# Patient Record
Sex: FEMALE | Race: BLACK OR AFRICAN AMERICAN
[De-identification: names, ages, dates, MRNs, and addresses within clinical notes are randomized per-mention and may not be internally consistent; named-entity substitution may affect disease eponyms.]

---

## 2017-02-03 ENCOUNTER — HOSPITAL ENCOUNTER (EMERGENCY)
Dept: HOSPITAL 25 - ED | Age: 32
Discharge: HOME | End: 2017-02-03
Payer: COMMERCIAL

## 2017-02-03 VITALS — SYSTOLIC BLOOD PRESSURE: 121 MMHG | DIASTOLIC BLOOD PRESSURE: 57 MMHG

## 2017-02-03 DIAGNOSIS — M25.572: Primary | ICD-10-CM

## 2017-02-03 DIAGNOSIS — M25.562: ICD-10-CM

## 2017-02-03 PROCEDURE — 99281 EMR DPT VST MAYX REQ PHY/QHP: CPT

## 2017-02-03 NOTE — RAD
HISTORY: Left ankle pain, trauma



COMPARISONS: None



VIEWS: 3, Frontal, lateral, and oblique views of the left ankle



FINDINGS:



BONE DENSITY: Normal.

BONES: There is a well-corticated bone fragment off the distal fibula consistent with

remote avulsion injury. There is no acute displaced fracture or dislocation.    

JOINTS: There is no arthropathy.    

ALIGNMENT: There is no dislocation. 

SOFT TISSUES: Unremarkable.



OTHER FINDINGS: None.



IMPRESSION: 

NO ACUTE OSSEOUS INJURY. IF SYMPTOMS PERSIST, RECOMMEND REPEAT IMAGING.

## 2017-02-03 NOTE — ED
Lower Extremity





- HPI Summary


HPI Summary: 


31 F presents with left ankle pain for 2 days.  She says she slipped on black 

ice and twisted her knee/ankle. She denies any previous injury to the area.  

She has been able to ambulate on it.  She denies any numbness or tingling.  








- History of Current Complaint


Chief Complaint: EDExtremityLower


Stated Complaint: LEFT ANKLE PAIN//FALL


Time Seen by Provider: 02/03/17 01:07


Hx Last Menstrual Period: 2/2016


Pain Intensity: 9





- Allergies/Home Medications


Allergies/Adverse Reactions: 


 Allergies











Allergy/AdvReac Type Severity Reaction Status Date / Time


 


No Known Allergies Allergy   Verified 07/18/16 16:38














PMH/Surg Hx/FS Hx/Imm Hx


Endocrine/Hematology History: 


   Denies: Hx Anticoagulant Therapy


Cardiovascular History: 


   Denies: Hx Hypertension





- Surgical History


Surgery Procedure, Year, and Place: LEAP procedure


Infectious Disease History: Yes


Infectious Disease History: Reports: Hx of Known/Suspected MRSA - groin abcess


   Denies: History Other Infectious Disease, Traveled Outside the  in Last 30 

Days





- Family History


Known Family History: Positive: None - noncontirbutory


   Negative: Cardiac Disease





- Social History


Alcohol Use: None


Substance Use Type: Reports: None


Smoking Status (MU): Never Smoked Tobacco


Have You Smoked in the Last Year: No





Review of Systems


Negative: Fever


Negative: Chest Pain


Negative: Shortness Of Breath


Positive: Myalgia - left ankle pain


All Other Systems Reviewed And Are Negative: Yes





Physical Exam


Triage Information Reviewed: Yes


Vital Signs On Initial Exam: 


 Initial Vitals











Temp Pulse Resp BP Pulse Ox


 


 98.8 F   99   18   121/57   100 


 


 02/03/17 00:55  02/03/17 00:55  02/03/17 00:55  02/03/17 00:55  02/03/17 00:55











Vital Signs Reviewed: Yes


Appearance: Positive: Well-Appearing


Skin: Positive: Warm, Dry


Head/Face: Positive: Normal Head/Face Inspection


Eyes: Positive: Normal, Conjunctiva Clear


ENT: Positive: Normal ENT inspection, Pharynx normal, TMs normal


Respiratory/Lung Sounds: Positive: Clear to Auscultation, Breath Sounds Present


Cardiovascular: Positive: Normal, RRR


Musculoskeletal: Positive: Strength/ROM Intact - of left knee, toes,, Limited @ 

- ankle due to pain, Other - edema noted over anterior talofibular ligament and 

tenderness over ligament, no step of noted, good pulses, sensation grossly 

intact, no tenderness of knee, capillary refill<2 secs





Diagnostics





- Vital Signs


 Vital Signs











  Temp Pulse Resp BP Pulse Ox


 


 02/03/17 00:55  98.8 F  99  18  121/57  100














- Laboratory


Lab Statement: Any lab studies that have been ordered have been reviewed, and 

results considered in the medical decision making process.





- Radiology


  ** ankle


Xray Interpretation: No Acute Changes


Radiology Interpretation Completed By: ED Physician





  ** knee


Xray Interpretation: No Acute Changes


Radiology Interpretation Completed By: ED Physician





  ** tibia


Xray Interpretation: No Acute Changes


Radiology Interpretation Completed By: ED Physician





Lower Extremity Course/Dx





- Course


Course Of Treatment: 31 F presents with left ankle/knee pain for two days.  She 

was walking when she slipped on black ice and twisted her ankle/knee. She has 

been able to ambulate since.  no edema noted of knee one exam and is nontender.

  tenderness of ankle over anterior talofibular ligament. I read xray as normal

, likely sprain, will treat conservatively, patient agrees with plan





- Diagnoses


Differential Diagnosis/HQI/PQRI: Positive: Fracture (Closed), Sprain, Strain


Provider Diagnoses: 


 Left ankle pain








Discharge





- Discharge Plan


Condition: Good


Disposition: HOME


Patient Education Materials:  Ankle Sprain (ED)


Forms:  *Work Release


Referrals: 


[facility] [place]


Additional Instructions: 


Ice, rest, elevate


Keep ace wrap on area


Take Tylenol or ibuprofen for pain 


Set up care with primary care office to follow up


Return to ED if develop any numbness, tingling, or ability to move joint or any 

new symptoms

## 2017-02-03 NOTE — RAD
HISTORY: Left lower leg pain, fall



COMPARISONS: Left ankle dated February 03, 2017



VIEWS: 2, Frontal and lateral views of the proximal left foreleg. The left ankle is not

included within the field-of-view the current examination.



FINDINGS:



BONE DENSITY: Normal.

BONES: There is no displaced fracture.    

JOINTS: There is no arthropathy.    

ALIGNMENT: There is no dislocation. 

SOFT TISSUES: Unremarkable.



OTHER FINDINGS: None.



IMPRESSION: 

NO ACUTE OSSEOUS INJURY OF THE VISUALIZED PORTION OF THE LEFT FORELEG. IF SYMPTOMS

PERSIST, RECOMMEND REPEAT IMAGING.

## 2017-02-03 NOTE — RAD
INDICATION: Left knee pain     



COMPARISON: None



TECHNIQUE: AP, lateral, and oblique views were obtained.



FINDINGS: The bony structures, joint spaces, and soft tissues are normal for age.



IMPRESSION: NEGATIVE EXAMINATION.

## 2017-02-09 ENCOUNTER — HOSPITAL ENCOUNTER (EMERGENCY)
Dept: HOSPITAL 25 - UCEAST | Age: 32
Discharge: HOME | End: 2017-02-09
Payer: SELF-PAY

## 2017-02-09 VITALS — DIASTOLIC BLOOD PRESSURE: 93 MMHG | SYSTOLIC BLOOD PRESSURE: 139 MMHG
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
## 2017-02-09 DIAGNOSIS — S93.402A: Primary | ICD-10-CM
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
## 2017-02-09 PROCEDURE — G0463 HOSPITAL OUTPT CLINIC VISIT: HCPCS

## 2017-02-09 PROCEDURE — 99202 OFFICE O/P NEW SF 15 MIN: CPT

## 2017-02-09 NOTE — UC
Lower Extremity/Ankle HPI





- HPI Summary


HPI Summary: 





Slipped and fell on ice 6 days ago, was seen at ED and had negative ankle, knee

, and lower leg x-rays. Was given an ace wrap and crutches in the ED, but not 

using crutches because she has continued to work. Still having a lot of pain 

with weight bearing, feels she has been unable to rest because of work 

responsibilities. Scheduled to work double shift tomorrow.





- History of Current Complaint


Chief Complaint: UCLowerExtremity


Stated Complaint: FOLLOW UP LEG INJURY


Time Seen by Provider: 02/09/17 13:12


Hx Obtained From: Patient


Hx Last Menstrual Period: DEPO


Pregnant?: No


Onset/Duration: Sudden Onset


Severity Initially: Moderate


Severity Currently: Mild


Aggravating Factor(s): Standing, Ambulation


Alleviating Factor(s): Rest


Able to Bear Weight: Yes


Related History: Occupational Injury





- Allergies/Home Medications


Allergies/Adverse Reactions: 


 Allergies











Allergy/AdvReac Type Severity Reaction Status Date / Time


 


No Known Allergies Allergy   Verified 07/18/16 16:38














PMH/Surg Hx/FS Hx/Imm Hx


Cardiovascular History Of: 


   Denies: Hypertension


Other History Of: 


   Negative For: Anticoagulant Therapy





- Surgical History


Surgical History: Yes


Surgery Procedure, Year, and Place: LEAP procedure





- Family History


Known Family History: Positive: None - noncontirbutory


   Negative: Cardiac Disease





- Social History


Occupation: Employed Full-time - [employer]


Lives: With Family


Alcohol Use: None


Substance Use Type: None


Smoking Status (MU): Never Smoked Tobacco


Have You Smoked in the Last Year: No





- Immunization History


Most Recent Influenza Vaccination: 11/7/13


Most Recent Tetanus Shot: UTD


Most Recent Pneumonia Vaccination: never





Review of Systems


Constitutional: Negative


Skin: Negative


Eyes: Negative


ENT: Negative


Respiratory: Negative


Cardiovascular: Negative


Gastrointestinal: Negative


Genitourinary: Negative


Motor: Negative


Neurovascular: Negative


Musculoskeletal: Arthralgia - L ankle pain with swelling


Neurological: Negative


Psychological: Negative


All Other Systems Reviewed And Are Negative: Yes





Physical Exam


Triage Information Reviewed: Yes


Appearance: Well-Appearing, No Pain Distress, Well-Nourished


Vital Signs: 


 Initial Vital Signs











Temp  99.9 F   02/09/17 13:04


 


Pulse  101   02/09/17 13:04


 


Resp  14   02/09/17 13:04


 


BP  139/93   02/09/17 13:04


 


Pulse Ox  99   02/09/17 13:04











Vital Signs Reviewed: Yes


Eye Exam: Normal


Eyes: Positive: Conjunctiva Clear


ENT Exam: Normal


ENT: Positive: Normal ENT inspection, Hearing grossly normal, Pharynx normal, 

TMs normal


Dental Exam: Normal


Neck exam: Normal


Neck: Positive: Supple, Nontender, No Lymphadenopathy


Respiratory Exam: Normal


Respiratory: Positive: Chest non-tender, Lungs clear, Normal breath sounds, No 

respiratory distress, No accessory muscle use


Cardiovascular Exam: Normal


Cardiovascular: Positive: RRR, No Murmur


Musculoskeletal: Positive: ROM Limited @ - L ankle, Other: - tender over L 

lateral ankle ligaments only. No bony tenderness.


Neurological Exam: Normal


Psychological Exam: Normal


Skin Exam: Normal





Lower Extremity Course/Dx





- Differential Dx/Diagnosis


Provider Diagnoses: L ankle sprain





Discharge





- Discharge Plan


Condition: Stable


Disposition: HOME


Patient Education Materials:  Ankle Sprain (ED)


Forms:  *Work Release


Referrals: 


Una Cabrera MD [Medical Doctor] - 1 Week


Additional Instructions: 


If your ankle does not start to improve with better rest and immobilization, 

you may need further imaging (follow-up x-ray or MRI) to be ordered by the 

orthopedist. Keep the boot on for all weight-bearing until you follow up with 

the orthopedist.

## 2017-03-16 ENCOUNTER — HOSPITAL ENCOUNTER (OUTPATIENT)
Dept: HOSPITAL 25 - OR | Age: 32
Discharge: HOME | End: 2017-03-16
Attending: SURGERY
Payer: COMMERCIAL

## 2017-03-16 VITALS — DIASTOLIC BLOOD PRESSURE: 94 MMHG | SYSTOLIC BLOOD PRESSURE: 138 MMHG

## 2017-03-16 DIAGNOSIS — L73.2: Primary | ICD-10-CM

## 2017-03-16 LAB
MANUAL ENTRY VERIFICATION: (no result)
UR PREG INTERNAL CONTROL: (no result)
UR PREG KIT LOT#: (no result)

## 2017-03-16 PROCEDURE — 87070 CULTURE OTHR SPECIMN AEROBIC: CPT

## 2017-03-16 PROCEDURE — 36415 COLL VENOUS BLD VENIPUNCTURE: CPT

## 2017-03-16 PROCEDURE — 81025 URINE PREGNANCY TEST: CPT

## 2017-03-16 PROCEDURE — 86703 HIV-1/HIV-2 1 RESULT ANTBDY: CPT

## 2017-03-16 PROCEDURE — 88305 TISSUE EXAM BY PATHOLOGIST: CPT

## 2017-03-16 PROCEDURE — 87205 SMEAR GRAM STAIN: CPT

## 2017-03-16 PROCEDURE — 87073 CULTURE BACTERIA ANAEROBIC: CPT

## 2017-03-16 RX ADMIN — FENTANYL CITRATE PRN MCG: 0.05 INJECTION, SOLUTION INTRAMUSCULAR; INTRAVENOUS at 15:37

## 2017-03-16 RX ADMIN — FENTANYL CITRATE PRN MCG: 0.05 INJECTION, SOLUTION INTRAMUSCULAR; INTRAVENOUS at 16:04

## 2017-03-17 NOTE — OP
DATE OF OPERATION:  03/16/17 - hospitals

 

DATE OF BIRTH:  [birth year]

 

SURGEON:  Kristopher Christy MD

 

ASSISTANT:  PATRICK Connolly

 

ANESTHESIOLOGIST:  Dr. Lindquist.

 

ANESTHESIA:  General anesthetic, local infiltration.

 

PRE-OP DIAGNOSIS:  Hidradenitis of the left groin.

 

POST-OP DIAGNOSIS:  Hidradenitis of the left groin.

 

OPERATIVE PROCEDURE:  Wide excision, hidradenitis of left groin.

 

DESCRIPTION OF PROCEDURE:  The patient was supine on the operative table. After 
adequate general anesthetic, compression stockings, Echo Hugger warmer, and 
intravenous antibiotics, the left leg was put in the frog leg position and the 
entire groin, thigh, and lower abdomen region were prepped with Betadine and 
draped in a sterile fashion.  An elliptical incision was created to encompass 
all the severely infected areas and tracts and this was carried down to the 
fascia to remove in its entirety and down on to the thigh.  This was mobilized 
and brought upward and the adipose approximated with 3-0 Vicryl and skin with 3-
0 Prolene.  Two areas, one above and one below, were left partially open to 
allow packing and the corner of 4x4 was put into each site followed by bulky 
gauze dressing.  She was awakened and brought to Recovery in good condition.  
No complications.  No drains. Sponge and instrument counts correct.  Estimated 
blood loss was 50 mL.



CC:  Dr. Piero Jaramillo*

 

 [id number]/CPS #: [id number]

MTDD

## 2018-07-03 ENCOUNTER — HOSPITAL ENCOUNTER (EMERGENCY)
Dept: HOSPITAL 25 - UCEAST | Age: 33
Discharge: HOME | End: 2018-07-03
Payer: SELF-PAY

## 2018-07-03 VITALS — SYSTOLIC BLOOD PRESSURE: 137 MMHG | DIASTOLIC BLOOD PRESSURE: 85 MMHG

## 2018-07-03 DIAGNOSIS — S69.91XA: Primary | ICD-10-CM
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
## 2018-07-03 DIAGNOSIS — G43.909: ICD-10-CM

## 2018-07-03 DIAGNOSIS — R03.0: ICD-10-CM

## 2018-07-03 PROCEDURE — G0463 HOSPITAL OUTPT CLINIC VISIT: HCPCS

## 2018-07-03 PROCEDURE — 99212 OFFICE O/P EST SF 10 MIN: CPT
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
## 2018-07-03 NOTE — RAD
HISTORY: right thumb injury



COMPARISONS: None



VIEWS: 3, Frontal, lateral, and oblique views of the first digit of the right hand



FINDINGS:



BONE DENSITY: Normal.

BONES: There is no displaced fracture.

JOINTS: There is no arthropathy.

ALIGNMENT: There is no dislocation. 

SOFT TISSUES: Unremarkable.



OTHER FINDINGS: None.



IMPRESSION: 

NO ACUTE OSSEOUS INJURY. IF SYMPTOMS PERSIST, RECOMMEND REPEAT IMAGING.

## 2019-04-09 ENCOUNTER — HOSPITAL ENCOUNTER (EMERGENCY)
Dept: HOSPITAL 25 - UCEAST | Age: 34
Discharge: HOME | End: 2019-04-09
Payer: SELF-PAY

## 2019-04-09 VITALS — SYSTOLIC BLOOD PRESSURE: 117 MMHG | DIASTOLIC BLOOD PRESSURE: 65 MMHG

## 2019-04-09 DIAGNOSIS — G56.21: Primary | ICD-10-CM

## 2019-04-09 PROCEDURE — 99212 OFFICE O/P EST SF 10 MIN: CPT

## 2019-04-09 PROCEDURE — G0463 HOSPITAL OUTPT CLINIC VISIT: HCPCS

## 2019-04-09 NOTE — UC
Hand/Wrist HPI





- HPI Summary


HPI Summary: 





34 year old female presents with complaints of numbness and tingling to the 

right ulnar arm, hand, ring and pinky fingers x 2 weeks. Denies injury, headache

, neck pain, facial droop, slurred or difficulty speaking, extremity weakness, 

chest pain, or SOB.





- History Of Current Complaint


Chief Complaint: UCUpperExtremity


Stated Complaint: RT HAND NUMBNESS


Time Seen by Provider: 04/09/19 10:34


Hx Obtained From: Patient


Hx Last Menstrual Period: depo


Pain Intensity: 0





- Allergies/Home Medications


Allergies/Adverse Reactions: 


 Allergies











Allergy/AdvReac Type Severity Reaction Status Date / Time


 


No Known Allergies Allergy   Verified 04/09/19 08:59














PMH/Surg Hx/FS Hx/Imm Hx


Previously Healthy: Yes - Denies significant PMH


Other History Of: 


   Negative For: Anticoagulant Therapy





- Surgical History


Surgical History: Yes


Surgery Procedure, Year, and Place: leep procedure , 15 yrs ago - [place].

  bilateral axillary hidrandenitis - 2 yrs ago - [place]





- Family History


Known Family History: Positive: Non-Contributory





- Social History


Occupation: Employed Full-time


Lives: With Family


Alcohol Use: Rare


Substance Use Type: Marijuana


Substance Use Comment - Amount & Last Used: occ.


Smoking Status (MU): Never Smoked Tobacco


Have You Smoked in the Last Year: No





- Immunization History


Most Recent Influenza Vaccination: 11/7/13


Most Recent Tetanus Shot: UTD


Most Recent Pneumonia Vaccination: never





Review of Systems


All Other Systems Reviewed And Are Negative: Yes


Constitutional: Positive: Negative


Skin: Positive: Negative


Respiratory: Positive: Negative


Cardiovascular: Positive: Negative


Gastrointestinal: Positive: Negative


Genitourinary: Positive: Negative


Musculoskeletal: Negative: Arthralgia, Decreased ROM, Edema, Myalgia


Neurological: Positive: Paresthesia


Psychological: Positive: Negative


Is Patient Immunocompromised?: No





Physical Exam





- Summary


Physical Exam Summary: 


GENERAL APPEARANCE: Well developed, well nourished, alert and cooperative, and 

appears to be in no acute distress.





NECK: Neck supple, non-tender.





CARDIAC: Normal S1 and S2. No S3, S4 or murmurs. Rhythm is regular. There is no 

peripheral edema, cyanosis or pallor. Extremities are warm and well perfused. 

Capillary refill is less than 2 seconds. Peripheral pulses intact.





LUNGS: Clear to auscultation without rales, rhonchi, wheezing or diminished 

breath sounds.





ABDOMEN: Positive bowel sounds. Soft, nondistended, nontender. No guarding or 

rebound. No masses or hepatosplenomegally.





MUSKULOSKELETAL: ROM intact to all extremities. No joint erythema or 

tenderness. Normal muscular development. Normal gait.





NEUROLOGICAL: Bilatearl  strength symmetric and intact throughout. Light 

touch and ability to determine sharp and dull sensation intact throughout all 

dermatomes of the right hand and arm.





SKIN: Skin normal color, texture and turgor with no lesions or eruptions.





Triage Information Reviewed: Yes


Vital Signs: 


 Initial Vital Signs











Temp  97.7 F   04/09/19 08:56


 


Pulse  91   04/09/19 08:56


 


Resp  29   04/09/19 08:56


 


BP  117/65   04/09/19 08:56


 


Pulse Ox  100   04/09/19 08:56











Vital Signs Reviewed: Yes





Hand/Wrist Course/Dx





- Course


Course Of Treatment: 


34 year old female presents with complaints of numbness and tingling to the 

right ulnar arm, hand, ring and pinky fingers x 2 weeks. Denies injury, headache

, neck pain, facial droop, slurred or difficulty speaking, extremity weakness, 

chest pain, or SOB. Afebrile. VSS. Exam was overall unremarkable. I suspect 

that her symptoms are likely an ulnar neuropathy at the elbow however patient 

noted the parathesia was most prominent in the ulnar hand, ring, and pinky 

fingers therefore she was placed in a cockup wrist splint. Recommending 

conservative treatment including a referral to PT for evaluation and treatment. 

She is to follow up with her PCP within 7 days. Anticipatory guidance and 

warning symptoms were reviewed with the patient. Verbalizes understanding and 

agrees with POC.








- Differential Dx/Diagnosis


Differential Diagnosis/HQI/PQRI: Carpal Tunnel Syndrome, Sprain, Strain, 

Tendonitis


Provider Diagnosis: 


 Ulnar neuropathy








Discharge





- Sign-Out/Discharge


Documenting (check all that apply): Patient Departure


All imaging exams completed and their final reports reviewed: No Studies





- Discharge Plan


Condition: Stable


Disposition: HOME


Patient Education Materials:  Cubital Tunnel Syndrome (ED)


Referrals: 


Piero Jaramillo MD [Primary Care Provider] - 7 Days


Additional Instructions: 


I suspect that your symptoms are from a condition called ulnar nerve 

neuropathy. It is most likely that this is occurring at the level of the elbow. 

We will try some conservative treatments to see if the symptoms improve.





Use the cock-up wrist splint that was provided to you.





Avoid leaning on the elbows when seated or driving and avoid prolonged elbow 

flexion.





The use of a soft foam elbow pad may also help to prevent compression of the 

ulnar nerve at the elbow.





I have given you an order for physical therapy which may be beneficial.





Follow up with your primary care provider in 7 days if no improvement in 

symptoms as you may need some nerve conduction testing to further evaluate the 

symptoms.





Seek immediate medical attention in the emergency room if you develop chest pain

, shortness of breath, sudden severe headache, facial droop, slurred or 

difficulty speaking, weakness of the extremities, or any worsening of symptoms.





- Billing Disposition and Condition


Condition: STABLE


Disposition: Home

## 2019-04-11 ENCOUNTER — HOSPITAL ENCOUNTER (EMERGENCY)
Dept: HOSPITAL 25 - UCEAST | Age: 34
Discharge: HOME | End: 2019-04-11
Payer: COMMERCIAL

## 2019-04-11 VITALS — DIASTOLIC BLOOD PRESSURE: 65 MMHG | SYSTOLIC BLOOD PRESSURE: 107 MMHG

## 2019-04-11 DIAGNOSIS — L02.31: Primary | ICD-10-CM

## 2019-04-11 DIAGNOSIS — L73.2: ICD-10-CM

## 2019-04-11 NOTE — UC
HPI Wound/Suture Re-check





- HPI Summary


HPI Summary: 





patient here for suture removal of buttocks where abscess was drained. Placed 

April 3, 2019.  No complaints of pain or temperature.





- History Of Current Complaint


Chief Complaint: UCLaceration


Stated Complaint: SUTURE REMOVAL


Time Seen by Provider: 04/11/19 09:06


Hx Last Menstrual Period: depo


Pain Intensity: 0





- Allergies/Home Medications


Allergies/Adverse Reactions: 


 Allergies











Allergy/AdvReac Type Severity Reaction Status Date / Time


 


No Known Allergies Allergy   Verified 04/11/19 08:36














PMH/Surg Hx/FS Hx/Imm Hx





- Additional Past Medical History


Additional PMH: 





PMH: hx of hidrandenitis supparativa.  On chronic doxycyline.





Family Hx: DM.


Previously Healthy: Yes


Other History Of: 


   Negative For: Anticoagulant Therapy





- Surgical History


Surgical History: Yes


Surgery Procedure, Year, and Place: leep procedure , 15 yrs ago - [place].

  bilateral axillary hidrandenitis - 2 yrs ago - [place]





- Family History


Known Family History: Positive: Diabetes, Non-Contributory





- Social History


Occupation: Employed Full-time - works with clients with disabilities


Alcohol Use: Rare


Substance Use Type: Marijuana


Substance Use Comment - Amount & Last Used: occ.


Smoking Status (MU): Never Smoked Tobacco


Have You Smoked in the Last Year: No





- Immunization History


Most Recent Influenza Vaccination: 11/7/13


Most Recent Tetanus Shot: UTD


Most Recent Pneumonia Vaccination: never





Review of Systems


All Other Systems Reviewed And Are Negative: Yes


Skin: Positive: Other - healed abscess buttocks


Respiratory: Positive: Negative.  Negative: Shortness Of Breath


Cardiovascular: Positive: Negative


Gastrointestinal: Positive: Negative


Genitourinary: Positive: Negative





Physical Exam





- Summary


Physical Exam Summary: 





Appearance: The patient is well-appearing, is in no pain or distress, and is 

well-nourished.


Eyes: Conjunctiva are clear.  Pupils are equal and reactive to light and 

accommodation.  Extra ocular muscle movement is intact.


ENT: The hearing is grossly normal, the pharynx is normal, and the TMs are 

normal. There is no muffled or hoarse voice.  No stridor.


Neck: The neck is supple and there is no lymphadenopathy.


Respiratory: The chest is nontender to palpation and without crepitus. The 

lungs are clear, there are normal breath sounds, and there is no respiratory 

distress.  No wheezes, rales or rhonchi.


Cardiovascular: Heart sounds reveal a regular rate and rhythm. There are no 

clicks, rubs or murmurs.  There are no carotid bruits or thrills.  Circulation 

is grossly intact.


Abdomen: The abdomen is soft and nontender. There is no organomegaly.  Bowel 

sounds are present and within normal limits.  No point tenderness at McBurneys 

point.


Musculoskeletal: Strength is intact. The patient moves all extremities.  


Neurological: The patient is alert. Motor and sensory are  examination grossly 

intact.  Speech is normal.


Psychological: The patient displays age appropriate behavior


Skin: area of buttocks; well healed abscess. No other skin abscess.





PROCEDURE: 3 SUTURES REMOVED. Slight induration.  No infection. Slight pain 

with palpation.





Triage Information Reviewed: Yes


Vital Signs: 


 Initial Vital Signs











Temp  98.8 F   04/11/19 08:33


 


Pulse  100   04/11/19 08:33


 


Resp  20   04/11/19 08:33


 


BP  107/65   04/11/19 08:33


 


Pulse Ox  100   04/11/19 08:33














Course/Dx





- Course


Course Of Treatment: 





33 yo for suture removal for abscess of buttocks.  No sign of infection.  

Slight induration and tenderness.  3 stitches removed.  Patient knows to follow 

up for increasing pain, swelling or redness.  Dx. is resolved abscess, buttocks.








- Differential Dx - Laceration/Wound


Differential Diagnoses: Suture Removal





- Diagnosis


Provider Diagnosis: 


 Visit for suture removal, Hidradenitis suppurativa








Discharge





- Sign-Out/Discharge


Documenting (check all that apply): Patient Departure


All imaging exams completed and their final reports reviewed: No Studies





- Discharge Plan


Condition: Stable


Disposition: HOME


Patient Education Materials:  Stitches Removal (ED)


Referrals: 


Piero Jaramillo MD [Primary Care Provider] - 


Additional Instructions: 


AS WE DISCUSSED: 





Your 3 stitches have been removed from the area of your buttocks.  There is 

still mild tenderness, but there is no sign of infection.  Watch for any 

increasing swelling, redness, or pain.  Recheck if you note any of these.


 


PLEASE SEEK CARE AT THE EMERGENCY DEPARTMENT IF SYMPTOMS WORSEN OR IF NEW 

SYMPTOMS DEVELOP.  





FOLLOW UP WITH YOUR PRIMARY CARE PHYSICIAN IF CONDITION CONTINUES BEYOND 3 DAYS 

WITHOUT IMPROVEMENT.


D





- Billing Disposition and Condition


Condition: STABLE


Disposition: Home

## 2019-06-08 ENCOUNTER — HOSPITAL ENCOUNTER (EMERGENCY)
Dept: HOSPITAL 25 - UCEAST | Age: 34
Discharge: HOME | End: 2019-06-08
Payer: COMMERCIAL

## 2019-06-08 VITALS — DIASTOLIC BLOOD PRESSURE: 68 MMHG | SYSTOLIC BLOOD PRESSURE: 121 MMHG

## 2019-06-08 DIAGNOSIS — M25.562: Primary | ICD-10-CM

## 2019-06-08 PROCEDURE — 96372 THER/PROPH/DIAG INJ SC/IM: CPT

## 2019-06-08 PROCEDURE — G0463 HOSPITAL OUTPT CLINIC VISIT: HCPCS

## 2019-06-08 PROCEDURE — 99212 OFFICE O/P EST SF 10 MIN: CPT

## 2019-06-08 NOTE — UC
Knee Pain HPI





- HPI Summary


HPI Summary: 





35 yo female c/o progressive left knee pain over the past week.  Works on her 

feet a lot, mostly nights.  Has been difficult to bear weight. Knee feels more 

warm and more swollen progressively.  Not red.  No fever / chills.  No recent 

injury.  No other joint c/o's.  No GI issues.  No rash.  No cough / sob.  Some 

swelling in both lower exts/ feet. 





- History of Current Complaint


Chief Complaint: UCLowerExtremity


Stated Complaint: KNEE COMPLAINT


Time Seen by Provider: 06/08/19 10:50


Hx Obtained From: Patient


Hx Last Menstrual Period: depo


Pain Intensity: 7





- Allergies/Home Medications


Allergies/Adverse Reactions: 


 Allergies











Allergy/AdvReac Type Severity Reaction Status Date / Time


 


No Known Allergies Allergy   Verified 06/08/19 10:50














PMH/Surg Hx/FS Hx/Imm Hx


Previously Healthy: Yes


Other History Of: 


   Negative For: Anticoagulant Therapy





- Surgical History


Surgical History: Yes


Surgery Procedure, Year, and Place: leep procedure , 15 yrs ago - [place].

  bilateral axillary hidrandenitis - 2 yrs ago - [place]





- Family History


Known Family History: Positive: Diabetes, Non-Contributory





- Social History


Alcohol Use: Rare


Substance Use Type: Marijuana


Substance Use Comment - Amount & Last Used: occ.


Smoking Status (MU): Never Smoked Tobacco


Have You Smoked in the Last Year: No





- Immunization History


Most Recent Influenza Vaccination: 11/7/13


Most Recent Tetanus Shot: UTD


Most Recent Pneumonia Vaccination: never





Review of Systems


All Other Systems Reviewed And Are Negative: Yes


Constitutional: Positive: Negative


Skin: Positive: Other - see hpi


Eyes: Positive: Negative


ENT: Positive: Negative


Respiratory: Positive: Negative


Cardiovascular: Positive: Negative


Gastrointestinal: Positive: Negative


Genitourinary: Positive: Negative


Motor: Positive: Other - see hpi


Neurovascular: Positive: Negative


Musculoskeletal: Positive: Other: - see hpi


Neurological: Positive: Negative


Psychological: Positive: Negative


Is Patient Immunocompromised?: No





Physical Exam


Triage Information Reviewed: Yes


Appearance: Well-Appearing, Well-Nourished


Vital Signs: 


 Initial Vital Signs











Temp  98 F   06/08/19 10:48


 


Pulse  78   06/08/19 10:48


 


Resp  17   06/08/19 10:48


 


BP  121/68   06/08/19 10:48


 


Pulse Ox  100   06/08/19 10:48











Vital Signs Reviewed: Yes


Eye Exam: Normal - grossly normal


ENT Exam: Normal - grossly normal


Neck exam: Normal


Respiratory Exam: Normal - RR normal, no dyspnea, no tachypnea


Cardiovascular Exam: Normal - HR normal, nondiaphoretic.


Abdominal Exam: Normal


Abdomen Description: Positive: Nontender - benign


Musculoskeletal Exam: Other - Left knee + swelling, tender inf patella and med 

patella.  + post knee fluctunce.  no redness.  No streaking.  Able to 

straighten albeit uncomfortable.  Not able to fully bend.   No open sores.  No 

kae joint laxity, but there is hesitation with examination


Neurological Exam: Normal - nonfocal


Psychological Exam: Normal - conversing easily and appropriately


Skin Exam: Normal - no visible or reported rash





Knee Pain Course/Dx





- Course


Course Of Treatment: 





Ketorolac 60mg im x 1


Xray L knee - see [facility].  





Has crutches at home. Will look into possible knee scooter (maybe at work). 





F/u orthopedics, referral given today.  





Questions as posed answered to the best of my ability. 





- Differential Dx/Diagnosis


Provider Diagnosis: 


 Knee pain, acute








Discharge





- Sign-Out/Discharge


Documenting (check all that apply): Post-Discharge Follow Up


All imaging exams completed and their final reports reviewed: Yes





- Discharge Plan


Condition: Stable


Disposition: HOME


Prescriptions: 


Meloxicam 7.5 mg PO DAILY #30 tablet


Patient Education Materials:  Knee Pain (ED)


Forms:  *Work Release


Referrals: 


Piero Jaramillo MD [Primary Care Provider] - 


Additional Instructions: 


Please stay off your foot as much as possible. 


Elevate frequently. 


Ace wrap (or neoprene wrap) during the day if possible. 





Please follow up with Orthopedics early this week. 





Seek medical attention for worse or new problems, especially if redness, fever, 

worse or new pain. 








- Billing Disposition and Condition


Condition: STABLE


Disposition: Home

## 2019-06-22 ENCOUNTER — HOSPITAL ENCOUNTER (EMERGENCY)
Dept: HOSPITAL 25 - ED | Age: 34
Discharge: HOME | End: 2019-06-22
Payer: SELF-PAY

## 2019-06-22 VITALS — SYSTOLIC BLOOD PRESSURE: 123 MMHG | DIASTOLIC BLOOD PRESSURE: 80 MMHG

## 2019-06-22 DIAGNOSIS — R07.81: ICD-10-CM

## 2019-06-22 DIAGNOSIS — R10.31: Primary | ICD-10-CM

## 2019-06-22 LAB
ALBUMIN SERPL BCG-MCNC: 3.8 G/DL (ref 3.2–5.2)
ALBUMIN/GLOB SERPL: 0.8 {RATIO} (ref 1–3)
ALP SERPL-CCNC: 44 U/L (ref 34–104)
ALT SERPL W P-5'-P-CCNC: 8 U/L (ref 7–52)
ANION GAP SERPL CALC-SCNC: 8 MMOL/L (ref 2–11)
AST SERPL-CCNC: 11 U/L (ref 13–39)
BASOPHILS # BLD AUTO: 0 10^3/UL (ref 0–0.2)
BUN SERPL-MCNC: 8 MG/DL (ref 6–24)
BUN/CREAT SERPL: 11.1 (ref 8–20)
CALCIUM SERPL-MCNC: 9.2 MG/DL (ref 8.6–10.3)
CHLORIDE SERPL-SCNC: 109 MMOL/L (ref 101–111)
EOSINOPHIL # BLD AUTO: 0 10^3/UL (ref 0–0.6)
GLOBULIN SER CALC-MCNC: 4.6 G/DL (ref 2–4)
GLUCOSE SERPL-MCNC: 120 MG/DL (ref 70–100)
HCG SERPL QL: 33.62 MIU/ML
HCO3 SERPL-SCNC: 22 MMOL/L (ref 22–32)
HCT VFR BLD AUTO: 36 % (ref 35–47)
HGB BLD-MCNC: 12 G/DL (ref 12–16)
LYMPHOCYTES # BLD AUTO: 1.8 10^3/UL (ref 1–4.8)
MCH RBC QN AUTO: 30 PG (ref 27–31)
MCHC RBC AUTO-ENTMCNC: 33 G/DL (ref 31–36)
MCV RBC AUTO: 89 FL (ref 80–97)
MONOCYTES # BLD AUTO: 0.4 10^3/UL (ref 0–0.8)
NEUTROPHILS # BLD AUTO: 4.2 10^3/UL (ref 1.5–7.7)
NRBC # BLD AUTO: 0 10^3/UL
NRBC BLD QL AUTO: 0
PLATELET # BLD AUTO: 300 10^3/UL (ref 150–450)
POTASSIUM SERPL-SCNC: 4 MMOL/L (ref 3.5–5)
PROT SERPL-MCNC: 8.4 G/DL (ref 6.4–8.9)
RBC # BLD AUTO: 4.07 10^6 /UL (ref 3.7–4.87)
RBC UR QL AUTO: (no result)
SODIUM SERPL-SCNC: 139 MMOL/L (ref 135–145)
WBC # BLD AUTO: 6.5 10^3/UL (ref 3.5–10.8)
WBC UR QL AUTO: (no result)

## 2019-06-22 PROCEDURE — 87086 URINE CULTURE/COLONY COUNT: CPT

## 2019-06-22 PROCEDURE — 84702 CHORIONIC GONADOTROPIN TEST: CPT

## 2019-06-22 PROCEDURE — 81015 MICROSCOPIC EXAM OF URINE: CPT

## 2019-06-22 PROCEDURE — 85025 COMPLETE CBC W/AUTO DIFF WBC: CPT

## 2019-06-22 PROCEDURE — 96374 THER/PROPH/DIAG INJ IV PUSH: CPT

## 2019-06-22 PROCEDURE — 96375 TX/PRO/DX INJ NEW DRUG ADDON: CPT

## 2019-06-22 PROCEDURE — 83690 ASSAY OF LIPASE: CPT

## 2019-06-22 PROCEDURE — 81003 URINALYSIS AUTO W/O SCOPE: CPT

## 2019-06-22 PROCEDURE — 87088 URINE BACTERIA CULTURE: CPT

## 2019-06-22 PROCEDURE — 74176 CT ABD & PELVIS W/O CONTRAST: CPT

## 2019-06-22 PROCEDURE — 36415 COLL VENOUS BLD VENIPUNCTURE: CPT

## 2019-06-22 PROCEDURE — 99283 EMERGENCY DEPT VISIT LOW MDM: CPT

## 2019-06-22 PROCEDURE — 96361 HYDRATE IV INFUSION ADD-ON: CPT

## 2019-06-22 PROCEDURE — 86140 C-REACTIVE PROTEIN: CPT

## 2019-06-22 PROCEDURE — 80053 COMPREHEN METABOLIC PANEL: CPT

## 2019-06-22 NOTE — ED
Complex/Multi-Sys Presentation





- HPI Summary


HPI Summary: 


34 year old F presenting to [facility] with a chief complaint of sharp right-sided 

flank pain since 23:00 yesterday. The patient rates the pain 7/10 in severity. 

Symptoms aggravated by deep breathing. Symptoms alleviated by nothing. Patient 

reports myalgia and chills. Patient denies cough, fever. Patient states she is 

having difficulty breathing due to the pain.





- History Of Current Complaint


Chief Complaint: EDShortnessOfBreath


Time Seen by Provider: 06/22/19 09:14


Hx Obtained From: Patient


Onset/Duration: Sudden Onset, Lasting Hours - 21:00 yesterday, Still Present


Timing: Constant


Severity Currently: Moderate


Aggravating Factor(s): Deep breathing


Alleviating Factor(s): Nothing


Associated Signs And Symptoms: Positive: Other - Myalgia, chills, difficulty 

breathing; NEG: Fever, cough





- Allergies/Home Medications


Allergies/Adverse Reactions: 


 Allergies











Allergy/AdvReac Type Severity Reaction Status Date / Time


 


No Known Allergies Allergy   Verified 06/22/19 09:17














PMH/Surg Hx/FS Hx/Imm Hx


Previously Healthy: No


Endocrine/Hematology History: 


   Denies: Hx Anticoagulant Therapy


Cardiovascular History: 


   Denies: Hx Hypertension


GI History: Reports: Hx Gastroesophageal Reflux Disease - only during pregnancy


Sensory History: Reports: Hx Contacts or Glasses - contacts, will wear glasses 

day of surgery


Opthamlomology History: Reports: Hx Contacts or Glasses - contacts, will wear 

glasses day of surgery


Neurological History: Reports: Hx Migraine - takes prn excedrin





- Surgical History


Surgery Procedure, Year, and Place: leep procedure , 15 yrs ago - [place].

  bilateral axillary hidrandenitis - 2 yrs ago - [place]


Hx Anesthesia Reactions: No


Infectious Disease History: No


Infectious Disease History: Reports: Hx of Known/Suspected MRSA - groin abcess


   Denies: History Other Infectious Disease, Traveled Outside the [place] in Last 30 

Days





- Family History


Known Family History: Positive: Diabetes


   Negative: Cardiac Disease, Hypertension





- Social History


Alcohol Use: Rare


Hx Substance Use: Yes


Substance Use Type: Reports: Marijuana


Substance Use Comment - Amount & Last Used: occ.


Hx Tobacco Use: No


Smoking Status (MU): Never Smoked Tobacco


Have You Smoked in the Last Year: No





Review of Systems


Positive: Chills.  Negative: Fever


Positive: Other - difficulty breathing .  Negative: Cough


Positive: flank pain - right


Positive: Myalgia


All Other Systems Reviewed And Are Negative: Yes





Physical Exam





- Summary


Physical Exam Summary: 


VITAL SIGNS: Reviewed.


GENERAL: Patient is a well-developed and nourished FEMALE who is in acute 

distress secondary to her pain.


HEAD AND FACE: No signs of trauma. No ecchymosis, hematomas or skull 

depressions. No sinus tenderness.


EYES: PERRLA, EOMI x 2, No injected conjunctiva, no nystagmus.


EARS: Hearing grossly intact. Ear canals and tympanic membranes are within 

normal limits.


MOUTH: Oropharynx within normal limits.


NECK: Supple, trachea is midline, no adenopathy, no JVD, no carotid bruit, no c-

spine tenderness, neck with full ROM.


CHEST: Symmetric, no tenderness at palpation


LUNGS: Clear to auscultation bilaterally. No wheezing or crackles.


CVS: Regular rate and rhythm, S1 and S2 present, no murmurs or gallops 

appreciated.


ABDOMEN: Soft, non-tender. No signs of distention. No rebound no guarding, and 

no masses palpated. Bowel sounds are normal.


BACK: Patient has right flank tenderness and right CVA tenderness 


EXTREMITIES: FROM in all major joints, no edema, no cyanosis or clubbing.


NEURO: Alert and oriented x 3. No acute neurological deficits. Speech is normal 

and follows commands.


SKIN: Dry and warm. 


Triage Information Reviewed: Yes


Vital Signs On Initial Exam: 


 Initial Vitals











Temp Pulse Resp BP Pulse Ox


 


 97.4 F   79   22   134/88   98 


 


 06/22/19 09:14  06/22/19 09:14  06/22/19 09:14  06/22/19 09:14  06/22/19 09:14











Vital Signs Reviewed: Yes





Diagnostics





- Vital Signs


 Vital Signs











  Temp Pulse Resp BP Pulse Ox


 


 06/22/19 09:14  97.4 F  79  22  134/88  98














- Laboratory


Result Diagrams: 


 06/22/19 09:38





 06/22/19 09:38


Lab Statement: Any lab studies that have been ordered have been reviewed, and 

results considered in the medical decision making process.





- Radiology


  ** Ribs with CXR


Radiology Interpretation Completed By: Radiologist


Summary of Radiographic Findings: 1. Negative exam. ED physician has reviewed 

this report.





- CT


  ** Abdomen/Pelvis


CT Interpretation Completed By: Radiologist


Summary of CT Findings: 1. Normal appendix documented. 2. Negative for 

obstructive uropathy. 3. No etiology for RIGHT flank pain evident. 4. Noted 

inflammatory change at the LEFT groin crease and labia majora corresponds with.

  site of recent abscess drainage per conversation with the referring 

physician. ED physician has reviewed this report.





Re-Evaluation





- Re-Evaluation


  ** First Eval


Re-Evaluation Time: 11:37


Change: Improved


Comment: Patient updated on plan of care. She is agreeable to discharge





Complex Multi-Symp Course/Dx


Assessment/Plan: 34 year old F presenting to [facility] with a chief complaint of 

sharp right-sided flank pain since 23:00 yesterday. The patient rates the pain 7

/10 in severity. Symptoms aggravated by deep breathing. Symptoms alleviated by 

nothing. Patient reports myalgia and chills. Patient denies cough, fever. 

Patient states she is having difficulty breathing due to the pain.  Blood work 

without a significant abnormality except for glucose of 128, CRP is 12.9, 

myoglobin is 4.6.  Abdominal and pelvic CT IMPRESSION:  1. Normal appendix 

documented.  2. Negative for obstructive uropathy.  3. No etiology for RIGHT 

flank pain evident.  4. Noted inflammatory change at the LEFT groin crease and 

labia majora corresponds with site of recent abscess drainage per conversation 

with the referring physician.  As reported by the Pelvic CT, the patient had an 

I&D of the left Labia majora.  CXR: negative exam.  In the ED course, the 

patient was given Toradol for the pain and the symptoms have significantly 

improved.  Since the patients blood test results, chest x-ray and abdominal 

pelvic CT are negative, I believe that the patients pain is probably secondary 

to muscular skeletal pain.  The patient is not tachycardic or hypoxic so I 

think that the patient may have a type of pulmonary embolism.  I discussed all 

the findings and test results with the patient. Patient was instructed to 

return to the emergency room immediately if any of the symptoms return worsens. 

Plan of care was discussed with the patient and understands and agrees. All 

questions were answered at patient satisfaction.  There were no further 

complaints or concerns. Lung exam before discharge: CTA B/L. Good air exchange. 

No wheezing or crackles heard. CVS: S1 and S2 present. No murmurs appreciated. 

Patient is alert and oriented x 3. Patient is hemodynamically stable. Patient 

will be discharged home with follow up PCP in the next 2-3 days.





- Diagnoses


Provider Diagnoses: 


 Flank pain, Rib pain on right side








Discharge





- Sign-Out/Discharge


Documenting (check all that apply): Patient Departure - Discharge


Patient Received Moderate/Deep Sedation with Procedure: No





- Discharge Plan


Condition: Stable


Disposition: HOME


Patient Education Materials:  Flank Pain (ED)


Referrals: 


Piero Jaramillo MD [Primary Care Provider] - 3 Days


Additional Instructions: 


Follow up with your primary care provider in 3 days. Return to the Emergency 

Department for new or worsening symptoms.





- Billing Disposition and Condition


Condition: STABLE


Disposition: Home





- Attestation Statements


Document Initiated by Scribe: Yes


Documenting Scribe: Cecile Thomas


Provider For Whom Scribe is Documenting (Include Credential): Basim Dias MD


Scribe Attestation: 


Cecile STONE, scribed for Basim Dias MD on 06/22/19 at 1306. 


Scribe Documentation Reviewed: Yes


Provider Attestation: 


The documentation as recorded by the [email or web address] joe accurately reflects 

the service I personally performed and the decisions made by Basim pickard MD


Status of Scribe Document: Viewed

## 2019-06-22 NOTE — XMS REPORT
Continuity of Care Document (C-CDA R2.1) (Encounter date: 2019 10:00 AM)

 Created on:2019



Patient:TANVI

Sex:Female

:1985

External Reference #:[id number]





Demographics







 Address  [address]



   [address]



   [place] [address]

 

 Work Phone  [phone]

 

 Mobile Phone  [phone]

 

 Home Phone  [phone]

 

 Email Address  [email or web address]

 

 Preferred Language  und

 

 Marital Status  Not  or 

 

 Taoist Affiliation  Unknown

 

 Race  Unknown

 

 Additional Race(s)  Other Race

 

 Ethnic Group  Not  or 









Author







 Organization  [facility]

 

 Address  [address] [place], [place] [address]

 

 Phone  [phone]









Support







 Name  Relationship  Address  Phone

 

 EDDA MOSELEY  Unavailable  Unavailable  [phone]









Care Team Providers







 Name  Role  Phone

 

 Lu NP, Michelle  Unavailable  Unavailable

 

 PPSFL, NURSE OR MA  Unavailable  Unavailable









Allergies, Adverse Reactions, Alerts







 Substance  Reaction  Status

 

 No Known Allergies    Active







Medications







 Medication  Instructions  Dosage  Effective Dates  Status  Comments



       (start - stop)    

 

 Depo-Provera 150 mg/mL  IM Q 11-13 weeks     -  Active  



 intramuscular suspension          







Problems







 Condition  Effective Dates (start -  Clinical Status  Comments



   stop)    

 

 Encounter for surveillance of      



 injectable contraceptive      

 

 Encounter for surveillance of      



 injectable contraceptive      

 

 Human immunodeficiency virus [HIV]   -    



 counseling      

 

 Encounter for screening for human   -    



 immunodeficiency virus      

 

 Encounter for oth screening for      



 malignant neoplasm of breast      

 

 Encntr screen for infections w      



 sexl mode of transmiss      

 

 Encounter for surveillance of      



 injectable contraceptive      

 

 Encntr for gyn exam (general)      



 (routine) w abnormal findings      

 

 Hidradenitis suppurativa      

 

 Candidiasis of vulva and vagina      

 

 Human immunodeficiency virus [HIV]   -    



 counseling      

 

 Encounter for surveillance of      



 injectable contraceptive      

 

 Encounter for surveillance of      



 injectable contraceptive      

 

 Encounter for surveillance of      



 injectable contraceptive      

 

 Encounter for surveillance of      



 injectable contraceptive      

 

 Encounter for surveillance of      



 injectable contraceptive      

 

 Human immunodeficiency virus [HIV]   -    



 counseling      

 

 Encounter for oth general cnsl and   -    



 advice on contraception      

 

 Body mass index (BMI) 31.0-31.9,      



 adult      

 

 Encounter for surveillance of      



 injectable contraceptive      

 

 Encounter for surveillance of      



 injectable contraceptive      

 

 Encounter for surveillance of      



 injectable contraceptive      

 

 Encounter for surveillance of      



 injectable contraceptive      

 

 Encounter for surveillance of      



 injectable contraceptive      

 

 Encounter for surveillance of      



 injectable contraceptive      

 

 Encntr screen for infections w      



 sexl mode of transmiss      

 

 Encounter for surveillance of      



 injectable contraceptive      

 

 Encounter for surveillance of      



 injectable contraceptive      

 

 Encounter for surveillance of      



 injectable contraceptive      

 

 Encounter for surveillance of      



 injectable contraceptive      

 

 LABORATORY EXAM NOS      

 

 LABORATORY EXAM NOS      







Procedures







 Procedure  Date

 

 INJECTION OR LAB ONLY VISIT EST  

 

 INJECTION DEPO/CEFTRIAXONE  

 

 OTHER Medical Services  

 

 Contraceptive  

 

 Cns.Svc. Other  

 

 Cns.Svc. STI  

 

 DEPO  







Results







 Test Name  Date and Time  Measure  Units  Reference Range  Abnormal Flag  
Status  Comments









 No information







Advance Directives







 Directive  Yes / No  Effective Date  File Name









 No information







Encounters







 Encounter  Practice  Location  Reason(s)  Diagnoses  Date  Provider  Providers



 Description      For Visit        Copied on



               Encounter

 

   Planned  PPSFL    Encounter for    Lu  Referring



   [facility]    surveillance of    Michelle. [address]  Provider:



   [facility]      injectable  [address],  Michelle



   Finger      contraceptive    [place], [place],  Raphaelidis



   [facility], [address]          [address].  , [address]



   [address]          tel:[phone]  [address],



   St, [place],          [address]  [place], [place],



   [place],            [address].



   [id number],            tel:[phone]



   [place]            [id number]



   tel:[phone]            lting



   [id number]            Provider:



               NURSE OR MA



               PPSFL.

 

   Planned  PPSFL    Encounter for    Javier  Referring



   [facility]    surveillance of    Caitlin. [address]  Provider:



   [facility]      injectable  [address],  Caitlin



   Finger      contraceptive    [place], [place],  Javier J, [address]



   [place], [address]          [address], [place].  [address]



   [address]          tel:[phone]  [address], [place],



   St, [place],          [address]  [place], [address].



   [place],            tel:[phone]



   [phone],            [address]



   [place]            



   tel:[phone]            



   [phone]            

 

   Planned  PPSFL    Human    White Sarah.  Referring



   [facility]  [place]    immunodeficienc  -  [address]  Provider:



   [facility]      y virus [HIV]  [address], [place],  Sarah



   [place]inger      counselingEncou    [place], [address],  [place], [address]



   [place], [address]      nter for    [place].  [address]



   [address]      screening for      [address], [place],



   St, [place],      human      [place], [address].



   [place],      immunodeficienc      



   [id number],      y      



   [place]      virusEncounter      



   tel:[phone]      for oth      



   [id number]      screening for      



         malignant      



         neoplasm of      



         breastEncntr      



         screen for      



         infections w      



         sexl mode of      



         transmissEncoun      



         ter for      



         surveillance of      



         injectable      



         contraceptiveEn      



         cntr for gyn      



         exam (general)      



         (routine) w      



         abnormal      



         findingsHidrade      



         nitis      



         suppurativaCand      



         idiasis of      



         vulva and      



         vagina      

 

   Planned  PPSFL    Human  Nov-0  Karly Smith.  Referring



   [facility]  [place]    immunodeficienc  6-  [address]  Provider:



   [facility]      y virus [HIV]  [address], [place],  Sarah



   [place]      [place]ou    [place], [address],  [place], [address]



   [place], [address]      nter for    [place].  [address]



   [address]      surveillance of      [address], [place],



   St, [place],      injectable      [place], [address].



   [place],      contraceptive      



   [id number],            



   [place]            



   tel:[phone]            



   [phone]            

 

   Planned  PPSFL    Encounter for  Aug-2  Lu  Referring



   [facility]    surveillance of  8  [address]le. [address]  Provider:



   [facility]      injectable  [address],  Michelle



   Finger      contraceptive    [place],  [place]hael[place]



   [facility], [address]          [address].  , [address]



   [address]          tel:[phone]  [address],



   St, [place],          [address]  [place],



   [place],            [address].



   [id number],            tel:[phone]



   [place]            [id number]



   tel:[phone]            lting



   453433            Provider:



               NURSE OR MA



               PPSFL.

 

   Planned  PPSFL    Encounter for    Karly Smith.  Referring



   [facility]    surveillance of  9  [address]  Provider:



   [facility]      injectable  [address], [place],  Sarah



   [place]aceptive    [place], [address],  [place], [address]



   [place]          [place].  [address]



   [address]            [address], [place],



   St, [place],            [place], [address].



   [place],            



   [id number],            



   [place]            



   tel:[phone]            



   [phone]            

 

   Planned  PPSFL    Encounter for    Karly Smith.  Referring



   [facility]    surveillance of  0-  [address]  Provider:



   [facility]      injectable  [address]  [address], [place],  Sarah



   [place]ace[place], [address],  [place], [address]



   [place], [address]          [place].  [address]



   [address]            [address], [place],



   St, [place]



   [place],            [address].Consu



   [id number],            lting



   [place]            Provider:



   tel:[phone]            NURSE OR MA



   [id number]            PPSFL.

 

   Planned  PPSFL    Encounter for    Karly Smith.  Referring



   [facility]    surveillance of    [address]  Provider:



   [facility]      injectable  [address]  [address], [place],  Sarah



   [place]aceptive    [place], [address],  [place], [address]



   [place], [address]          [place].  [address]



   [address]            [address], [place],



   St, [place],



   [place],            [address].Consu



   [id number],            lting



   [place]            Provider:



   tel:[phone]            NURSE OR MA



   [id number]            PPSFL.

 

   Planned  PPSFL    Human  Nov-  Karly Smith.  Referring



   [facility]    immunodeficienc  0-201  [address]  Provider:



   [facility]      y virus [HIV]  [address], [place],  Sarah



   [place]      counselingEncou    [place], [address],  [place], [address]



   [place], [address]      nter for oth    [place].  [address]



   [address]      general cnsl      , [place],



   St, [place],      and advice on      [place], [address].



   [place],      contraceptionBo      



   [id number],      dy mass index      



   [place]      (BMI)      



   tel:[phone]      31.0-31.9,      



   778[address]      adultEncounter      



         for      



         surveillance of      



         injectable      



         contraceptive      

 

   Planned  PPSFL    Encounter for  Sep-0  Goodreau-Hem  Referring



   [facility]    surveillance of  [address]  [address].  Provider:



   [facility]      injectable  [address]  [address]  [place]



   Finger      contraceptive    St, [place],  Karma R,



   [place], [address]          [place], [address].  [address]



   [address]          tel:[phone]  [address],



   St, [place],          [address]  [place],



   [place],            [address].



   [id number],            tel:[phone]



   [place]            [id number]



   tel:[phone]            lting



   870329            Provider:



               NURSE OR MA



               PPSFL.

 

   Planned  PPSFL    Encounter for  Jul-0  Karly Smith.  Referring



   [facility]    surveillance of  3-201  [address]  Provider:



   [facility]      injectable  [address], [place],  [place]



   [place]aceptive    [place], [address],  Karma R,



   [facility], [address]          [place].  [address]



   [address]            [address],



   St, [place],            [place],



   [place],            [address].



   [id number],            tel:[phone]



   [place]            [id number]



   tel:[phone]            lting



   969925            Provider:



               NURSE OR MA



               PPSFL.

 

   Planned  PPSFL    Encounter for    White Sarah.  Referring



   [facility]    surveillance of    [address]  Provider:



   [facility]      injectable  [address]  [address], [place],  [place]



   [place]aceptive    [place], [address],  [place]ao Chen, 620          [place].  [address]



   [address]            [address],



   St, [place],            [place],



   [place],            [address].



   [id number],            tel:[phone]



   [place]            [id number]



   tel:[phone]            lting



   464497            Provider:



               NURSE OR MA



               PPSFL.

 

   Planned  PPSFL    Encounter for    Borglum  Referring



   [facility]    surveillance of  -  Carrie. [address]  Provider:



   [facility]      injectable  [address],  [place]



   Finger      contraceptive    [place],  Pao Chen, [address]          [address], [place].  [address]



   [address]          tel:[phone]  [address],



   , [place],          [address]  [place],



   [place],            [address].



   [id number],            tel:[phone]



   [place]            [id number]



   tel:[phone]            lting



   667344            Provider:



               NURSE OR MA



               PPSFL.

 

   Planned  PPSFL    Encounter for    Guggino  Referring



   [facility]    surveillance of    Olivia.  Provider:



   [facility]      injectable  [address]  [address]  [place]



   Finger      contraceptiveEn    St, [place],  Pao Chen, [address]      cntr screen for    [place], [address],  [address]



   [address]      infections w    [place].  [address],



   St, [place],      sexl mode of    tel:[phone]  [place],



   [place],      transmiss    [address]  [address].



   [id number],            tel:[phone]



   [place]            [address]



   tel:[phone]            



   903414            

 

   Planned  PPSFL    Encounter for  Aug-3  White Sarah.  Referring



   [facility]    surveillance of    [address]  Provider:



   [facility]      injectable  [address]  [address], [place],  [place]



   [place]aceptive    [place], [address],  Pao Chen, 620          [place].  [address]



   [address]            [address],



   St, [place],            [place],



   [place],            [address].



   [id number],            tel:[phone]



   [place]            [id number]



   tel:[phone]            lting



   507936            Provider:



               NURSE OR MA



               PPSFL.

 

   Planned  PPSFL    Encounter for    White Sarah.  Referring



   [facility]    surveillance of    [address]  Provider:



   [facility]      injectable  [address]  [address], [place],  [place]



   [place]aceptive    [place], [address],  Karma CORDOBA,



   [facility], [address]          [place].  [address]



   [address]            [address],



   St, [place],            [place],



   [place],            [address].



   [id number],            tel:[phone]



   [place]            [id number]



   tel:[phone]            lting



   281390            Provider:



               NURSE OR MA



               PPSFL.

 

   Planned  PPSFL        [place].  



   [facility]  [place]        [address]  



   [facility]        [address], [place],  



   [place]          [place], [address].  



   [facility], [address]          tel:[phone]  



   [address]          [address]  



   [address], [place],            



   [place],            



   [id number],            



   [place]            



   tel:[phone]            



   935612            

 

   Planned  PPSFL    Encounter for    [place].  Referring



   [facility]    surveillance of    [address]  Provider:



   [facility]      injectable  6  , [place],  [place]



   [place]aceptive    [place], [address].  [place], [address]



   [facility], [facility]          tel:[phone]  [address]



   [address]  [address], [place],



   St, [place],            [place], [address].



   [place],            tel:[phone]



   [id number],            [id number]



   [place]            lting



   tel:[phone]            Provider:



   [id number]            NURSE OR MA



               PPSFL.

 

   Planned  PPSFL    Encounter for  Oct-2  Parete  Referring



   [facility]    surveillance of    Kathy. [address]  Provider:



   [facility]      injectable  [address],  [place]



   Finger      contraceptive    [place],  [place], 620



   [facility], [facility]          [address].  [address]



   [address]          tel:[phone]  [address], [place],



   St, [place],          [address]  [place], [address].



   [place],            tel:[phone]



   [phone],            [address]



   [place]            



   tel:[phone]            



   643430            

 

   Planned  PPSFL    LABORATORY EXAM  Oct-3  DiCostanzo  Consulting



   [facility]    NOS  -  [place]. [address]  Provider:



   [facility]        [address],  NURSE OR MA



   Finger          [place],  PPSFL.



   [facility], [facility]          [address].  



   [address]          tel:[phone]  



   St, [place],          [address]  



   [place],            



   [id number],            



   [place]            



   tel:[phone]            



   [phone]            

 

   Planned  PPSFL    LABORATORY EXAM  Sep-  Goodreau-Hem  



   [facility]  [place]    NOS  [address]  radha [address].  



   [facility]        [address]  



   Finger          [facility],  



   [place], [address]          [place], [address].  



   [address]          tel:[phone]  



   [facility],          [address]  



   [place],            



   [id number],            



               



   tel:[phone]            







Family History







 Family Member  Diagnosis  Age At Onset

 

 Sister  No history of Myocardial infarction before age 65  

 

 Brother  No history of Myocardial infarction before age 55  

 

 Father  No history of Myocardial infarction  

 

   No family history of Blood clots, legs  

 

 Brother  No history of Stroke before age 55  

 

 1st degree relative  No hx of osteoporosis  

 

 Sister  No history of Stroke before age 65  

 

 Father  No history of Stroke  

 

 Mother  No history of Stroke  

 

 Mother  No history of Myocardial infarction  

 

 1st degree relative  No hx of venous thromboembolism  

 

 1st degree relative  No hx of coronary heart disease (female <65, male  



   <55)  

 

 1st degree relative  No hx of cancer of breast, colon, endometrium or  



   ovary  

 

   No family history of Blood clots, lung  







Immunizations







 Vaccine  Date  Status  Comments

 

 HPV, unspecified formulation  Sep-  administered  Note: PER [facility] HX ; 
Source:



       Source Unspecified

 

 HPV, unspecified formulation  Sep-  administered  Note: PER HEALTH HX ; 
Source:



       Source Unspecified

 

 HPV (9-valent)  Sep-  administered  Note: PER HEALTH HX ; Source:



       Source Unspecified







Payers







 Payer name  Insurance type  Covered party ID  Authorization(s)

 

 [facility]  [id number]  







Social History







 Type  Description  Quantity  Date Captured  Comments

 

 Alcohol Use Details  Unknown      

 

 Caffeine Use Details  Unknown      

 

 Tobacco Use Status  Unknown      

 

 Smoking Status  Never smoker      

 

 Birth Sex  Female      







Vital Signs







 Date /  Height  Weight  BMI  Pulse  Blood  Temperature  Respiratory  Body  
Head  BMI  Pulse  Inhaled



 Time:        Rate  Pressure    Rate  Surface  Circumference  percentile  Ox  Ox



                 Area        









 No information







Chief Complaint And Reason For Visit

No information



Reason For Referral







 Reason For Referral

 

 No information







Plan Of Treatment







 Date  Type  Action  Status









 No information







History Of Present Illness







 Encounter Date  Complaint  History Of Present Illness









 No information







Functional Status







 Date  Functional Assessment









 No information







Medications Administered







 Medication  Instructions  Dosage  Effective Dates (start - stop)  Status  
Comments









 No information







Instructions







 Date  Instruction  Additional Information









 No information







Assessments







 Type  Assessment  Date

 

 assessment  Encounter for surveillance of injectable contraceptive  







Goals







 Health Concern  Goal  Type  Priority  Status  Date









 No information







Medical Equipment







 Description  Device  Universal Device Identifier  Effective Dates (start - stop
)  Status









 No information







Mental Status







 Date  Cognitive Assessment









 No information







Health Concerns







 Observation  Date









 No information









 Concern  Status  Date









 No information

## 2019-06-23 ENCOUNTER — HOSPITAL ENCOUNTER (EMERGENCY)
Dept: HOSPITAL 25 - ED | Age: 34
Discharge: HOME | End: 2019-06-23
Payer: SELF-PAY

## 2019-06-23 VITALS — DIASTOLIC BLOOD PRESSURE: 75 MMHG | SYSTOLIC BLOOD PRESSURE: 132 MMHG

## 2019-06-23 DIAGNOSIS — R05: ICD-10-CM

## 2019-06-23 DIAGNOSIS — N76.0: ICD-10-CM

## 2019-06-23 DIAGNOSIS — L03.111: ICD-10-CM

## 2019-06-23 DIAGNOSIS — G43.909: ICD-10-CM

## 2019-06-23 DIAGNOSIS — R06.02: ICD-10-CM

## 2019-06-23 DIAGNOSIS — Z86.14: ICD-10-CM

## 2019-06-23 DIAGNOSIS — R07.89: Primary | ICD-10-CM

## 2019-06-23 LAB
ALBUMIN SERPL BCG-MCNC: 3.8 G/DL (ref 3.2–5.2)
ALBUMIN/GLOB SERPL: 0.8 {RATIO} (ref 1–3)
ALP SERPL-CCNC: 42 U/L (ref 34–104)
ALT SERPL W P-5'-P-CCNC: 8 U/L (ref 7–52)
ANION GAP SERPL CALC-SCNC: 6 MMOL/L (ref 2–11)
AST SERPL-CCNC: 10 U/L (ref 13–39)
BASOPHILS # BLD AUTO: 0 10^3/UL (ref 0–0.2)
BUN SERPL-MCNC: 7 MG/DL (ref 6–24)
BUN/CREAT SERPL: 9.6 (ref 8–20)
CALCIUM SERPL-MCNC: 9.1 MG/DL (ref 8.6–10.3)
CHLORIDE SERPL-SCNC: 108 MMOL/L (ref 101–111)
EOSINOPHIL # BLD AUTO: 0.1 10^3/UL (ref 0–0.6)
GLOBULIN SER CALC-MCNC: 4.6 G/DL (ref 2–4)
GLUCOSE SERPL-MCNC: 113 MG/DL (ref 70–100)
HCG SERPL QL: < 0.6 MIU/ML
HCO3 SERPL-SCNC: 25 MMOL/L (ref 22–32)
HCT VFR BLD AUTO: 35 % (ref 35–47)
HGB BLD-MCNC: 11.8 G/DL (ref 12–16)
INR PPP/BLD: 1.16 (ref 0.82–1.09)
LYMPHOCYTES # BLD AUTO: 2.1 10^3/UL (ref 1–4.8)
MCH RBC QN AUTO: 30 PG (ref 27–31)
MCHC RBC AUTO-ENTMCNC: 33 G/DL (ref 31–36)
MCV RBC AUTO: 89 FL (ref 80–97)
MONOCYTES # BLD AUTO: 0.5 10^3/UL (ref 0–0.8)
NEUTROPHILS # BLD AUTO: 4 10^3/UL (ref 1.5–7.7)
NRBC # BLD AUTO: 0 10^3/UL
NRBC BLD QL AUTO: 0.1
PLATELET # BLD AUTO: 310 10^3/UL (ref 150–450)
POTASSIUM SERPL-SCNC: 3.7 MMOL/L (ref 3.5–5)
PROT SERPL-MCNC: 8.4 G/DL (ref 6.4–8.9)
RBC # BLD AUTO: 3.98 10^6 /UL (ref 3.7–4.87)
SODIUM SERPL-SCNC: 139 MMOL/L (ref 135–145)
TROPONIN I SERPL-MCNC: 0 NG/ML (ref ?–0.04)
WBC # BLD AUTO: 6.8 10^3/UL (ref 3.5–10.8)

## 2019-06-23 PROCEDURE — 80053 COMPREHEN METABOLIC PANEL: CPT

## 2019-06-23 PROCEDURE — 84702 CHORIONIC GONADOTROPIN TEST: CPT

## 2019-06-23 PROCEDURE — 99284 EMERGENCY DEPT VISIT MOD MDM: CPT

## 2019-06-23 PROCEDURE — 36415 COLL VENOUS BLD VENIPUNCTURE: CPT

## 2019-06-23 PROCEDURE — 85379 FIBRIN DEGRADATION QUANT: CPT

## 2019-06-23 PROCEDURE — 85025 COMPLETE CBC W/AUTO DIFF WBC: CPT

## 2019-06-23 PROCEDURE — 71275 CT ANGIOGRAPHY CHEST: CPT

## 2019-06-23 PROCEDURE — 93005 ELECTROCARDIOGRAM TRACING: CPT

## 2019-06-23 PROCEDURE — 84484 ASSAY OF TROPONIN QUANT: CPT

## 2019-06-23 PROCEDURE — 85610 PROTHROMBIN TIME: CPT

## 2019-06-23 NOTE — ED
HPI Chest Pain





- HPI Summary


HPI Summary: 


Pt. is a 34 y.o female who presents to the ER for CP and SOB x 1 day. Pt. was 

seen in ED yesterday for abd/flank pain. Pt. had labs and ct scan that were 

unremarkable. Pt. states her pain moved into her chest today and she feels 

short of breath. Past hx of hidradenitis. Pt. receives depo. Pt. notes recent 

cough but denies fever, abd. pain, V/D, or urinary sxs. NO hx of asthma. Sxs 

are moderate in severity. NO current modifying factors. 








- History of Current Complaint


Chief Complaint: EDChestPainROMI


Time Seen by Provider: 06/23/19 06:09


Hx Obtained From: Patient


Hx Last Menstrual Period: depo


Pain Intensity: 10





- Allergy/Home Medications


Allergies/Adverse Reactions: 


 Allergies











Allergy/AdvReac Type Severity Reaction Status Date / Time


 


No Known Allergies Allergy   Verified 06/22/19 09:17














PMH/Surg Hx/FS Hx/Imm Hx


Previously Healthy: Yes


Endocrine/Hematology History: 


   Denies: Hx Anticoagulant Therapy


Cardiovascular History: 


   Denies: Hx Hypertension


GI History: Reports: Hx Gastroesophageal Reflux Disease - only during pregnancy


Sensory History: Reports: Hx Contacts or Glasses - contacts, will wear glasses 

day of surgery


Opthamlomology History: Reports: Hx Contacts or Glasses - contacts, will wear 

glasses day of surgery


Neurological History: Reports: Hx Migraine - takes prn excedrin





- Surgical History


Surgery Procedure, Year, and Place: leep procedure , 15 yrs ago - [facility].

  bilateral axillary hidrandenitis - 2 yrs ago - [facility]


Hx Anesthesia Reactions: No





- Immunization History


Immunizations Up to Date: Yes


Infectious Disease History: No


Infectious Disease History: Reports: Hx of Known/Suspected MRSA - groin abcess


   Denies: History Other Infectious Disease, Traveled Outside the [place] in Last 30 

Days





- Family History


Known Family History: Positive: Diabetes


   Negative: Cardiac Disease, Hypertension





- Social History


Occupation: Employed Full-time


Lives: With Family


Alcohol Use: Rare


Hx Substance Use: Yes


Substance Use Type: Reports: None


Substance Use Comment - Amount & Last Used: occ.


Hx Tobacco Use: No


Smoking Status (MU): Never Smoked Tobacco


Have You Smoked in the Last Year: No





Review of Systems


Constitutional: Negative


Negative: Fever, Chills


ENT: Negative


Positive: Chest Pain.  Negative: Palpitations


Positive: Shortness Of Breath, Cough


Gastrointestinal: Negative


Negative: Abdominal Pain, Vomiting, Diarrhea, Nausea


Genitourinary: Negative


Musculoskeletal: Negative


Skin: Negative


Negative: Rash


Neurological: Negative


All Other Systems Reviewed And Are Negative: Yes





Physical Exam


Triage Information Reviewed: Yes


Vital Signs On Initial Exam: 


 Initial Vitals











Temp Pulse Resp BP Pulse Ox


 


 97.3 F   82   16   131/88   97 


 


 06/23/19 05:59  06/23/19 05:59  06/23/19 05:59  06/23/19 05:59  06/23/19 05:59











Vital Signs Reviewed: Yes


Appearance: Positive: Well-Appearing - Pt. sitting up in bed in NAD. Very 

talkative.


Skin: Positive: Warm, Dry


Head/Face: Positive: Normal Head/Face Inspection


Eyes: Positive: Normal, EOMI


Neck: Positive: Supple


Respiratory/Lung Sounds: Positive: Clear to Auscultation, Breath Sounds 

Present.  Negative: Rales, Rhonchi, Wheezes


Cardiovascular: Positive: Normal, RRR.  Negative: Murmur


Abdomen Description: Positive: Other: - Obese. Abd. is soft and nontender 

throughout. No CVA tenderness bilaterally.


Musculoskeletal: Positive: Normal, Strength/ROM Intact, Other - Pain diffusely 

to the chest wall on palpation.


Neurological: Positive: Normal, CN Intact II-III


Psychiatric: Positive: Affect/Mood Appropriate





Diagnostics





- Vital Signs


 Vital Signs











  Temp Pulse Resp BP Pulse Ox


 


 06/23/19 06:30   67  21   99


 


 06/23/19 06:23   68  23   99


 


 06/23/19 06:21   80  13  136/93  99


 


 06/23/19 05:59  97.3 F  82  16  131/88  97














- Laboratory


Lab Results: 


 Lab Results











  06/23/19 06/23/19 06/23/19 Range/Units





  06:13 06:13 06:13 


 


WBC  6.8    (3.5-10.8)  10^3/uL


 


RBC  3.98    (3.70-4.87)  10^6 /uL


 


Hgb  11.8 L    (12.0-16.0)  g/dL


 


Hct  35    (35-47)  %


 


MCV  89    (80-97)  fL


 


MCH  30    (27-31)  pg


 


MCHC  33    (31-36)  g/dL


 


RDW  15    (10-15)  %


 


Plt Count  310    (150-450)  10^3/uL


 


MPV  6.3 L    (7.4-10.4)  fL


 


Neut % (Auto)  59.9    %


 


Lymph % (Auto)  31.6    %


 


Mono % (Auto)  7.3    %


 


Eos % (Auto)  1.0    %


 


Baso % (Auto)  0.2    %


 


Absolute Neuts (auto)  4.0    (1.5-7.7)  10^3/ul


 


Absolute Lymphs (auto)  2.1    (1.0-4.8)  10^3/ul


 


Absolute Monos (auto)  0.5    (0-0.8)  10^3/ul


 


Absolute Eos (auto)  0.1    (0-0.6)  10^3/ul


 


Absolute Basos (auto)  0.0    (0-0.2)  10^3/ul


 


Absolute Nucleated RBC  0.0    10^3/ul


 


Nucleated RBC %  0.1    


 


INR (Anticoag Therapy)   1.16 H   (0.82-1.09)  


 


D-Dimer, Quantitative   Pending   


 


Sodium    139  (135-145)  mmol/L


 


Potassium    3.7  (3.5-5.0)  mmol/L


 


Chloride    108  (101-111)  mmol/L


 


Carbon Dioxide    25  (22-32)  mmol/L


 


Anion Gap    6  (2-11)  mmol/L


 


BUN    7  (6-24)  mg/dL


 


Creatinine    0.73  (0.51-0.95)  mg/dL


 


Est GFR ( Amer)    111.1  (>60)  


 


Est GFR (Non-Af Amer)    91.8  (>60)  


 


BUN/Creatinine Ratio    9.6  (8-20)  


 


Glucose    113 H  ()  mg/dL


 


Calcium    9.1  (8.6-10.3)  mg/dL


 


Total Bilirubin    0.30  (0.2-1.0)  mg/dL


 


AST    10 L  (13-39)  U/L


 


ALT    8  (7-52)  U/L


 


Alkaline Phosphatase    42  ()  U/L


 


Troponin I    0.00  (<0.04)  ng/mL


 


Total Protein    8.4  (6.4-8.9)  g/dL


 


Albumin    3.8  (3.2-5.2)  g/dL


 


Globulin    4.6 H  (2-4)  g/dL


 


Albumin/Globulin Ratio    0.8 L  (1-3)  











Result Diagrams: 


 06/23/19 06:13





 06/23/19 06:13


Lab Statement: Any lab studies that have been ordered have been reviewed, and 

results considered in the medical decision making process.





Chest Pain Course/Dx





- Course


Course Of Treatment: Pt. presenting for CP and SOB. She is afebrile and with 

stable VS. Pt. had abd. CT and CXR yesterday that were negative for acute 

findings. Given CP and hormonal BC use will check DDimer.  ECG done at 0601 

shows a sinus rhythm of 68bpm, normal axis, no ST elevation or depression.  

Labs are unremarkable other than elevated ddimer. CTA ordered to r.o PE.  CTA 

negative for PE but does she signs of possible cellulitis to right axilla, per 

radiology. Pt. notes she has a hx of hidradenitis suppurativa. Results 

discussed with pt. Her cp is reproducible. Examine right axilla and pt. has a 

few areas of induration and erythema. NO fluctuance noted. WIll place on 

doxycycline for potential cellulitis. Advised warm compreeses. Advised NSAID 

for discomfort as directed. CLose fu with PCP and return to ER if sxs change or 

worsen.





- Chest Pain


Differential Diagnosis/HQI/PQRI: Chest Wall, Lower Respiratory Infection, 

Pulmonary Edema





- Diagnoses


Provider Diagnoses: 


 Chest wall pain, Cellulitis








Discharge





- Sign-Out/Discharge


Documenting (check all that apply): Patient Departure


Patient Received Moderate/Deep Sedation with Procedure: No





- Discharge Plan


Condition: Good


Disposition: HOME


Prescriptions: 


DOXYcycline CAP(*) [DOXYcycline 100MG CAP(*)] 100 mg PO BID #20 cap


Sulfamethox/Trimethoprim DS* [Bactrim /160 TAB*] 1 tab PO BID #14 tab


Patient Education Materials:  Cellulitis (ED), Chest Wall Pain (ED)


Forms:  *Work Release


Referrals: 


Piero Jaramillo MD [Primary Care Provider] - 


Additional Instructions: 


Follow up with PCP and dermatology as scheduled


Antibiotic as directed


Can continue mobic as directed


Apply warm compresses


Return to ER if symptoms change or worsen





- Billing Disposition and Condition


Condition: GOOD


Disposition: Home Roslyn White, DO: 40M with pleuritic & exertional CP for prolonged period of time with symptom onset ~9 hours prior to evaluation - will do single troponin, d-dimer for pleurisy & CXR to eval for other causes of pleurisy. EKG NSR - no evidence of miriam/myocarditis. Likely d/c. Roslyn White, DO: 40M with pleuritic & exertional CP for prolonged period of time with symptom onset ~9 hours prior to evaluation - will do single troponin, d-dimer for pleurisy & CXR to eval for other causes of pleurisy. EKG NSR - no evidence of miriam/myocarditis. HEART score 2-3, pending troponin. Anticipate d/c with o/p cardiology appt.

## 2019-06-25 NOTE — PN
Progress Note





- Progress Note


Date of Service: 06/25/19


Note: 


patient urine culture grew Corynebacterium stritum 50-75,000. patient placed on 

doxycycline.  patient has vaginal abscess so this is likely a contaminant from 

this. will have stop doxycyline and switch to bactrim ds bid X7 days as will 

cover this. spoke with patient about change at 7:18am

## 2019-06-28 ENCOUNTER — HOSPITAL ENCOUNTER (EMERGENCY)
Dept: HOSPITAL 25 - ED | Age: 34
Discharge: HOME | End: 2019-06-28
Payer: COMMERCIAL

## 2019-06-28 VITALS — SYSTOLIC BLOOD PRESSURE: 112 MMHG | DIASTOLIC BLOOD PRESSURE: 73 MMHG

## 2019-06-28 DIAGNOSIS — M25.562: Primary | ICD-10-CM

## 2019-06-28 DIAGNOSIS — Z87.891: ICD-10-CM

## 2019-06-28 PROCEDURE — 96372 THER/PROPH/DIAG INJ SC/IM: CPT

## 2019-06-28 PROCEDURE — 99282 EMERGENCY DEPT VISIT SF MDM: CPT

## 2019-06-28 NOTE — XMS REPORT
Continuity of Care Document (CCD)

 Created on:2019



Patient:Miladis Tinajero

Sex:Female

:1985

External Reference #:[id number]





Demographics







 Address  [address] [place] [id number]

 

 Mobile Phone  [phone]

 

 Email Address  [email or web address]

 

 Preferred Language  en

 

 Marital Status  Not  or 

 

 [employer] Affiliation  Unknown

 

 Race  Black / 

 

 Ethnic Group  Not  or 









Author







 Name  Katiana Fall









Support







 Name  Relationship  Address  Phone

 

 Ava Adkins  Unavailable  Unavailable  [phone]









Care Team Providers







 Name  Role  Phone

 

 Piero Jaramillo MD  Primary Care Physician  Unavailable









Payers







 Date  Identification Numbers  Payment Provider  Subscriber

 

 Effective: 2018  Policy Number: [id number]  [employer]  
Miladis Tinajero









 PayID: [id number]  [address] [address]









 [place] [address]









 Effective: 2018  Policy Number: [id number]  [employer]  
Miladis Tinajero









 Expires: 2018  PayID: [id number]  [address] [address]









 [place] [address]







Family History







 Date  Family Member(s)  Observation  Comments

 

   General  No Current Problems  







Social History







 Type  Date  Description  Comments

 

 Birth Sex    Unknown  

 

 Marital Status    Single  

 

 Lives With    Family  

 

 Occupation    Currently Working  

 

 ETOH Use    Denies alcohol use  

 

 Tobacco Use  Start: Unknown  Patient has never smoked  

 

 Recreational Drug Use    Current Drug User  marijuana use

 

 Smoking Status  Reviewed: 19  Patient has never smoked  

 

 Exercise Type/Frequency    Does not exercise  







Allergies, Adverse Reactions, Alerts







 Description

 

 No Known Drug Allergies







Medications







 Active Medications  SIG  Qnty  Indications  Ordering Provider  Date

 

 Depo-Provera  every 3 months      Unknown  



         150mg/ml          



 Suspension          



           

 

 Excedrin Migraine  1 tab every 6      Unknown  



   hours as needed        



 [id number] Tablets  for pain        



           

 

 Spironolactone  twice a day      León Baker,  



           25mg Tablets        AURORA MELGAR  



           

 

 Meloxicam  Take 2 Tablets      Unknown  



      7.5mg Tablets  By Mouth Once        



   Daily For 7 Days        



   Then as Needed        



   For Pain        

 

 Doxycycline Monohydrate        Unknown  



                    100mg          



 Capsules          



           









 History Medications









 Doxycycline Hyclate  1 by mouth twice  20caps    Kristopher Christy,  2017 -



   a day      M.DNadira  Unknown



 100mg Capsules          



           

 

 Doxycycline Hyclate  1 cap by mouth  28caps  L73.2  Gilson Tobiasbeatrizs,  2016 -



   twice a day      NICKIE STANFORD  Unknown



 100mg Capsules          



           

 

 Prednisone  2 tabs by mouth  7tabs  L73.2  Costa DNadira  2016 -



           20mg  daily for 2 days      CUBA Bates  Unknown



 Tablets  then 1 tabs by        



   mouth for 3 days        

 

 Clindamycin  apply twice daily  120ml  L73.2  Costa DNadira  2016 -



 Phosphate  to affected area      CUBA Bates  Unknown



          1% Solution          



           

 

 Doxycycline Hyclate  1 cap by mouth  30caps  L73.2  Costa DNadira  2016 -



   twice a day with      CUBA Bates  Unknown



 100mg Capsules  food        



           

 

 Bactrim DS  1 by mouth twice      Unknown   -



           800-160mg  a day        2016



 Tablets          



           

 

 Oxycodone-Acetaminop  1-2 by mouth      Unknown   -



 hen  every 4-6 hours        Unknown



    5-325mg Tablets  as needed for        



   pain.        







Vital Signs







 Date  Vital  Result  Comment

 

 2019  2:50pm  Height  64 inches  5'4"









 Weight  172.00 lb  

 

 BP Systolic  132 mmHg  

 

 BP Diastolic  74 mmHg  

 

 Respiratory Rate  18 /min  

 

 Pain Level  7  

 

 BMI (Body Mass Index)  29.5 kg/m2  









 05/15/2019  9:44am  Height  64 inches  5'4"









 Weight  173.00 lb  

 

 Heart Rate  90 /min  

 

 BP Systolic Sitting  104 mmHg  

 

 BP Diastolic Sitting  60 mmHg  

 

 Respiratory Rate  18 /min  

 

 Body Temperature  97.9 F  

 

 BMI (Body Mass Index)  29.7 kg/m2  









 2017 10:34am  Heart Rate  78 /min  









 Respiratory Rate  16 /min  

 

 Body Temperature  97.7 F  









 2017  1:49pm  Heart Rate  90 /min  









 Respiratory Rate  18 /min  

 

 Body Temperature  98.0 F  









 2017  8:58am  Heart Rate  72 /min  









 Respiratory Rate  16 /min  

 

 Body Temperature  97.8 F  









 2017  9:34am  Heart Rate  90 /min  









 Respiratory Rate  18 /min  

 

 Body Temperature  98.7 F  









 2017 11:32am  Heart Rate  62 /min  









 Respiratory Rate  16 /min  

 

 Body Temperature  98.8 F  









 2017 10:31am  Heart Rate  72 /min  









 BP Systolic  120 mmHg  

 

 BP Diastolic  72 mmHg  

 

 Respiratory Rate  18 /min  

 

 Body Temperature  98.1 F  









 2017  9:09am  Heart Rate  84 /min  









 BP Systolic  118 mmHg  

 

 BP Diastolic  78 mmHg  

 

 Respiratory Rate  18 /min  

 

 Body Temperature  99.1 F  









 2016 11:04am  Heart Rate  96 /min  









 BP Systolic  140 mmHg  

 

 BP Diastolic  80 mmHg  

 

 Respiratory Rate  18 /min  

 

 Body Temperature  98.6 F  









 2016 10:46am  Height  64 inches  5'4"









 Weight  194.38 lb  

 

 Heart Rate  62 /min  

 

 BP Systolic Sitting  128 mmHg  

 

 BP Diastolic Sitting  80 mmHg  

 

 Respiratory Rate  14 /min  

 

 Body Temperature  98.3 F  

 

 Pain Level  9  

 

 BMI (Body Mass Index)  33.4 kg/m2  









 2016 10:42am  Height  64 inches  5'4"









 Weight  190.00 lb  

 

 Heart Rate  72 /min  

 

 BP Systolic Sitting  110 mmHg  

 

 BP Diastolic Sitting  68 mmHg  

 

 Respiratory Rate  14 /min  

 

 Body Temperature  98.5 F  

 

 BMI (Body Mass Index)  32.6 kg/m2  







Results







 Test  Date  Facility  Test  Result  H/L  Range  Note

 

 Laboratory test    [facility]  Surgical  SEE RESULT      1



 finding  7  [address]  Pathology  [place]      



     [place] [address]          



     [phone]          

 

 Wound    [facility]  Wound/Misc  SEE RESULT      2



 Culture/Sensi  7  [address]  Culture-Gram  [place]      



     [place] [address]  Stain        



     [phone]          

 

 Laboratory test    [facility]  Anaerobic  SEE RESULT      3



 finding  7  [address]  Culture  [place]      



     [place] [address]          



     [phone]          

 

 Laboratory test    [facility]  HIV 1&2 AB  Nonreactive  N  
Nonreactive  4



 finding  [address]  [address]  Self Referred        



     [place] [address]          



     [phone]          

 

 Laboratory test    [facility]  Pregnancy  Negative  N  
Negative  5



 finding  7  [address]  (HCG) [place]rine        



     [place] [address]          



     [phone]          

 

 CBC Auto Diff    [facility]  White Blood  5.8 10^3/uL  N  
3.5-10.8  



   6  [address]  [place]        



     [place] [address]          



     [phone]          









 Red Blood Count  4.39 10^6/uL  N  4.0-5.4  

 

 Hemoglobin  12.8 g/dL  N  12.0-16.0  

 

 Hematocrit  39 %  N  35-47  

 

 Mean Corpuscular Volume  88 fL  N  80-97  

 

 Mean Corpuscular Hemoglobin  29 pg  N  27-31  

 

 Mean Corpuscular HGB Conc  33 g/dL  N  31-36  

 

 Red Cell Distribution Width  14 %  N  10.5-15  

 

 Platelet Count  259 10^3/uL  N  150-450  

 

 Mean Platelet Volume  7 um3  Low  7.4-10.4  

 

 Abs Neutrophils  2.3 10^3/uL  N  1.5-7.7  

 

 Abs Lymphocytes  3.0 10^3/uL  N  1.0-4.8  

 

 Abs Monocytes  0.4 10^3/uL  N  0-0.8  

 

 Abs Eosinophils  0.1 10^3/uL  N  0-0.6  

 

 Abs Basophils  0 10^3/uL  N  0-0.2  

 

 Abs Nucleated RBC  0.01 10^3/uL  N    

 

 Granulocyte %  40.1 %  N  38-83  

 

 Lymphocyte %  51.5 %  High  25-47  

 

 Monocyte %  6.7 %  N  1-9  

 

 Eosinophil %  1.0 %  N  0-6  

 

 Basophil %  0.7 %  N  0-2  

 

 Nucleated Red Blood Cells %  0.1  N    









 Comp Metabolic Panel  2016  [facility]  Sodium  137 mmol/L  N
  133-145  



     [address] [place] [address] [phone]          









 Potassium  4.2 mmol/L  N  3.5-5.0  

 

 Chloride  111 mmol/L  N  101-111  

 

 Co2 Carbon Dioxide  23 mmol/L  N  22-32  

 

 Anion Gap  3 mmol/L  N  2-11  

 

 Glucose  99 mg/dL  N    

 

 Blood Urea Nitrogen  6 mg/dL  N  6-24  

 

 Creatinine  0.80 mg/dL  N  0.51-0.95  

 

 BUN/Creatinine Ratio  7.5  Low  8-20  

 

 Calcium  8.6 mg/dL  N  8.6-10.3  

 

 Total Protein  7.7 g/dL  N  6.4-8.9  

 

 Albumin  3.8 g/dL  N  3.2-5.2  

 

 Globulin  3.9 g/dL  N  2-4  

 

 Albumin/Globulin Ratio  1.0  N  1-3  

 

 Total Bilirubin  0.20 mg/dL  N  0.2-1.0  

 

 Alkaline Phosphatase  37 U/L  N    

 

 Alt  11 U/L  N  7-52  

 

 Ast  15 U/L  N  13-39  

 

 Egfr Non-  84.2  N  >60  

 

 Egfr   108.3  N  >60  6









 Laboratory test  2016  [facility]  Hemoglobin A1c  5.9 %  N  
Less than  7



 finding    [address]  (Glyco HGB)      6.0  



     [place] [address] [phone]          









 1  SEE RESULT BELOW



   -----------------------------------------------------------------------------
---------------



   Name:  MILADIS TINAJERO                     : 1985    Attend Dr: Kristopher Christy MD



   Acct:  [id number]  Unit: [id number]  AGE: 32            Location:  OR



   Re17                        SEX: F             Status:    [facility]



   -----------------------------------------------------------------------------
---------------



   



   SPEC: [id number]             STEVEN: 17-          [facility] DR: Kristopher Christy MD



   REQ:  [id number]             RECD: 



   STATUS: SOUT



   _



   ORDERED:  LEVEL IV



   



   FINAL DIAGNOSIS



   



   



   Skin and subcutaneous tissue, left groin, resection:



   -- Hidradenitis suppurativa with abscess formation.



   -- Lymph nodes (incidental) with reactive follicular lymphoid hyperplasia.



   



   



   



   PRE-OPERATIVE DIAGNOSIS



   



   Hidradenitis suppurativa



   



   GROSS DESCRIPTION



   



   The specimen is received in formalin labeled, Hidradenitis Left Groin, and 
consists of a



   10.5 x 5.0 cm gray-brown hairbearing wrinkled and focally nodular skin 
ellipse excised to a



   maximum depth of 4.9 cm.  There is a 2.4 x 1.5 cm partially detached brown-
gray wrinkled



   portion of skin excised to a depth of 2.3 cm along one margin.  There are 
two discrete



   erythematous sinus tract measuring 2.6 by up to 0.5 cm and 3.8 by up to 0.5 
cm within the



   subcutaneous soft tissue.  There are two tan-pink lymph nodes with rare 
focal hemorrhage



   measuring 1.3 x 1.2 x 1.0 cm and 2.5 x 1.8 x 1.2 cm.  The remaining cut 
surface consists of



   yellow lobulated adipose tissue and tan-white rubbery fibrous tissue.  The 
specimen is



   inked, serially sectioned and representative sections are submitted in 
cassettes A through D



   to include lymph nodes in cassettes C and D.



   



   Signed __________(signature on file)___________ Stas Hinkle MD  [id number]



   



   -----------------------------------------------------------------------------
---------------



   



   



   



   



   



   



   



   



   ** END OF REPORT **



   



   * ML=Testing performed at Main Lab



   DEPARTMENT OF PATHOLOGY,  [address] [place] [address]



   Phone # [phone]      Fax #[phone]



   Stas Hinkle M.D. Director     [facility] # [id number]

 

 2  SEE RESULT BELOW



   -----------------------------------------------------------------------------
---------------



   Name:  TINAJERO,MILADIS                     : 1985    Attend Dr: Kristopher Christy MD



   Acct:  [id number]  Unit: [id number]  AGE: 32            Location:  OR



   Re17                        SEX: F             Status:    DEP SDC



   -----------------------------------------------------------------------------
---------------



   



   SPEC: [id number]         STEVEN:       [facility] DR: Kristopher Christy MD



   REQ:  [id number]              RECD:   



   STATUS: COMP             OTHR DR: Diana [facility] Phys,[facility]



   _



   SOURCE: GROIN LEFT     [facility]:



   ORDERED:  Culture   Stain



   



   -----------------------------------------------------------------------------
---------------



   Procedure                         Result                         Reported   
        Site



   -----------------------------------------------------------------------------
---------------



   Wound/Misc Gram Stain  Final                                     17-
1600      ML



   4+ Neutrophils



   



   4+ Gram Positive Cocci



   2+ Gram Positive Bacilli



   



   Wound/Misc Culture  Final                                        17-
0953      ML



   



   Organism 1                     PEPTOSTREPTOCOCCUS ANAEROBIUS



   Quantity                    1+



   



   WITH POSSIBLE ADDITIONAL ANAEROBES



   - UNABLE TO ISOLATE FOR FURTHER IDENTIFICATION.



   



   Anaerobic sensitivities are not routinely performed.



   Positive isolates will be saved for one week.  Please call



   the Microbiology Laboratory if susceptibility testing is



   needed.



   



   -----------------------------------------------------------------------------
---------------



   * ML - MAIN LAB ([facility])



   .



   



   



   



   



   



   



   



   



   



   



   



   ** END OF REPORT **



   



   * ML=Testing performed at Main Lab



   DEPARTMENT OF PATHOLOGY,  [address] [place] [address]



   Phone # [phone]      Fax #[phone]



   Stas Hinkle M.D. Director     [facility] # [id number]

 

 3  SEE RESULT BELOW



   -----------------------------------------------------------------------------
---------------



   Name:  MILADIS TINAJERO                     : 1985    Attend Dr: Kristopher Christy MD



   Acct:  [id number]  Unit: [id number]  AGE: 32            Location:  OR



   Re17                        SEX: F             Status:    DEP SDC



   -----------------------------------------------------------------------------
---------------



   



   SPEC: [id number]         STEVEN:       [facility] DR: Kristopher Christy MD



   REQ:  [id number]              RECD:   



   STATUS: COMP             OTHR DR: Diana [facility]



   _



   SOURCE: WOUND          SPDESC:OTHER



   ORDERED:  Anaerobic Cult



   COMMENTS: LEFT GROIN



   



   -----------------------------------------------------------------------------
---------------



   Procedure                         Result                         Reported   
        Site



   -----------------------------------------------------------------------------
---------------



   Anaerobic Culture  Final                                         17-
953      ML



   



   Organism 1                     PEPTOSTREPTOCOCCUS ANAEROBIUS



   Quantity                    2+



   



   WITH POSSIBLE ADDITIONAL ANAEROBES



   - UNABLE TO ISOLATE FOR FURTHER IDENTIFICATION.



   



   -----------------------------------------------------------------------------
---------------



   * ML - MAIN LAB ([facility])



   .



   



   



   



   



   



   



   



   



   



   



   



   



   



   



   



   



   



   



   



   



   



   ** END OF REPORT **



   



   * ML=Testing performed at Main Lab



   DEPARTMENT OF PATHOLOGY,  [address] [place] [address]



   Phone # [phone]      Fax #[phone]



   Stas Hinkle M.D. Director     [facility] # [id number]

 

 4  It is recognized that currently available assays for the



   detection of antibodies to HIV-1 and/or HIV-2 may not detect



   all infected individuals. HIV antibodies may be undetectable



   in some stages of the infection and in some clinical



   conditions. The performance of this assay has not been



   established for populations of infants or children.



   



   Assayed by Chemiluminescence Microparticle Immunoassay on



   the Siemens Advia Centaur CP. Values obtained with different



   methods or kits cannot be used interchangeably.The



   diagnostic specificity of the ADVIA Centaur 1/O/2 Enhanced



   assay in the low risk population was 99.90% (6052/6058) with



   a 95% confidence interval of 99.78 to 99.96%.

 

 5  If pregnancy is still suspected, please repeat test after



   48 to 72 hours.



   This test detects intact HCG only and is indicated for the



   early detection of pregnancy.

 

 6  *******Because ethnic data is not always readily available,



   this report includes an eGFR for both -Americans and



   non- Americans.****



   The National Kidney Disease Education Program (NKDEP) does



   not endorse the use of the MDRD equation for patients that



   are not between the ages of 18 and 70, are pregnant, have



   extremes of body size, muscle mass, or nutritional status,



   or are non- or non-.



   According to the National Kidney Foundation, irrespective of



   diagnosis, the stage of the disease is based on the level of



   kidney function:



   Stage Description                      GFR(mL/min/1.73 m(2))



   1     Kidney damage with normal or decreased GFR       90



   2     Kidney damage with mild decrease in GFR          60-89



   3     Moderate decrease in GFR                         30-59



   4     Severe decrease in GFR                           15-29



   5     Kidney failure                       <15 (or dialysis)

 

 7  Therapeutic target for the treatment of diabetes



   Mellitus patients is <7% HBA1C, and in selective



   patients <6.0%.Please refer to American Diabetes



   Association Diabetic care guidelines for further



   information.







Procedures







 Date  Code  Description  Status

 

 2019  23331  Inject/Drain Joint/Bursa Major W/O US  Completed

 

 2019  26838  Esophagogastroduodenoscopy, diagnostic, incl brush/wash if  
Completed



     perfor  

 

 2017  56411  Excise Sweat Gland Lesion Inguinal Complex  Completed

 

 2017  59165  Excise Sweat Gland Lesion Inguinal Complex  Completed







Encounters







 Type  Date  Location  Provider  Dx  Diagnosis

 

 Office Visit  05/15/2019  Surgical  Davin AGUILAR  K21.0  Gastro-esophageal



   9:30a  [facility]  MD Darling    reflux disease with



           esophagitis









 R13.10  Dysphagia, unspecified









 Office Visit  2017  9:00a  Surgical  Kristopher ACEVEDO73.2  Hidradenitis



     [facility]  CUBA Christy    suppurativa

 

 Office Visit  2016 11:00a  Surgical  REILLY Macias.2  Hidradenitis



     [facility]  NICKIE STANFORD    suppurativa

 

 Office Visit  2016 10:30a  [facility] Jazmyn GROVER.2  
Hidradenitis



     Infectious  CUBA Bates    suppurativa



     Diseases      









 M79.605  Pain in left leg









 Office Visit  2016 10:30a  [facility]  Costa GROVER.2  Hidradenitis



     For Infectious  CUBA Bates    suppurativa



     Diseases      







Plan of Treatment

2019 - Moe KARRIE Marc, MDM25.562 Pain in left kneeNew Xrays:MRI Knee 
Left W/O, Ordered: 19Follow up:Follow up:   after MRIS80.02xA Contusion 
of left knee, initial encounter

## 2019-06-28 NOTE — ED
Lower Extremity





- HPI Summary


HPI Summary: 


34 year old female presents with left knee pain for the past 2 weeks.  She 

bumped her knee 2 weeks ago.  she states the swelling has been coming and  

going.  States that pain makes it difficult to ambulate.  States it feels very 

unsteady.  She denies any numbness or tingling.  Denies any new injury.  No 

fevers.  No rash. 





- History of Current Complaint


Chief Complaint: EDExtremityLower


Stated Complaint: FOOT INJURY


Time Seen by Provider: 06/28/19 15:06


Hx Last Menstrual Period: depo


Pain Intensity: 9





- Allergies/Home Medications


Allergies/Adverse Reactions: 


 Allergies











Allergy/AdvReac Type Severity Reaction Status Date / Time


 


No Known Allergies Allergy   Verified 06/22/19 09:17














PMH/Surg Hx/FS Hx/Imm Hx


Endocrine/Hematology History: 


   Denies: Hx Anticoagulant Therapy, Hx Diabetes


Cardiovascular History: 


   Denies: Hx Hypertension


GI History: Reports: Hx Gastroesophageal Reflux Disease - only during pregnancy


Sensory History: Reports: Hx Contacts or Glasses - contacts, will wear glasses 

day of surgery


Opthamlomology History: Reports: Hx Contacts or Glasses - contacts, will wear 

glasses day of surgery


Neurological History: Reports: Hx Migraine - takes prn excedrin





- Surgical History


Surgery Procedure, Year, and Place: leep procedure , 15 yrs ago - [facility].

  bilateral axillary hidrandenitis - 2 yrs ago - [facility]


Hx Anesthesia Reactions: No





- Immunization History


Immunizations Up to Date: Yes


Infectious Disease History: No


Infectious Disease History: Reports: Hx of Known/Suspected MRSA - groin abcess


   Denies: History Other Infectious Disease, Traveled Outside the [place] in Last 30 

Days





- Family History


Known Family History: Positive: Diabetes


   Negative: Cardiac Disease, Hypertension





- Social History


Alcohol Use: Rare


Hx Substance Use: Yes


Substance Use Type: Reports: None


Substance Use Comment - Amount & Last Used: occ.


Hx Tobacco Use: No


Smoking Status (MU): Former Smoker


Have You Smoked in the Last Year: No





Review of Systems


Negative: Fever


Negative: Chest Pain


Negative: Shortness Of Breath


Positive: Myalgia - left knee pain


All Other Systems Reviewed And Are Negative: Yes





Physical Exam


Triage Information Reviewed: Yes


Vital Signs On Initial Exam: 


 Initial Vitals











Temp Pulse Resp BP Pulse Ox


 


 99.0 F   87   16   109/72   99 


 


 06/28/19 14:17  06/28/19 14:17  06/28/19 14:17  06/28/19 14:17  06/28/19 14:17











Vital Signs Reviewed: Yes


Appearance: Positive: Well-Appearing


Skin: Positive: Warm, Dry


Head/Face: Positive: Normal Head/Face Inspection


Eyes: Positive: Normal, Conjunctiva Clear


ENT: Positive: Pharynx normal


Respiratory/Lung Sounds: Positive: Clear to Auscultation, Breath Sounds Present


Cardiovascular: Positive: Normal, RRR


Musculoskeletal: Positive: Strength/ROM Intact - left knee with pain, Edema Left

, Other - not warm to touch, no rash, neg ballotment, good pulses


Neurological: Positive: Normal


Psychiatric: Positive: Normal





Diagnostics





- Vital Signs


 Vital Signs











  Temp Pulse Resp BP Pulse Ox


 


 06/28/19 14:17  99.0 F  87  16  109/72  99














- Laboratory


Lab Statement: Any lab studies that have been ordered have been reviewed, and 

results considered in the medical decision making process.





- Radiology


  ** knee


Radiology Interpretation Completed By: Radiologist


Summary of Radiographic Findings: IMPRESSION:  NO ACUTE OSSEOUS INJURY. IF 

SYMPTOMS PERSIST, RECOMMEND REPEAT IMAGING.





Lower Extremity Course/Dx





- Course


Course Of Treatment: 34 year old female presents with left knee pain for the 

past 2 weeks.  She bumped her knee 2 weeks ago.  she states the swelling has 

been coming and  going.  States that pain makes it difficult to ambulate.  

States it feels very unsteady.  She denies any numbness or tingling.  Denies 

any new injury.  No fevers.  No rash.  On exam no rash noted to knee.  Has 

range of motion with pain.  No evidence of septic arthritis.  X-ray normal.  

with swelling we'll place on short course of steroids. gave knee immobilize and 

crutches  We'll have follow-up with orthopedic.  Patient understands agrees the 

plan.





- Diagnoses


Differential Diagnosis/HQI/PQRI: Positive: Contusion, Fracture (Closed), Sprain


Provider Diagnoses: 


 Left knee pain








Discharge





- Sign-Out/Discharge


Documenting (check all that apply): Patient Departure


Patient Received Moderate/Deep Sedation with Procedure: No





- Discharge Plan


Condition: Good


Disposition: HOME


Prescriptions: 


methylPREDNISolone [Medrol Dosepak 4 MG*] 4 mg PO .SEE TOSHIA INSTRUCTION #1 packet


Patient Education Materials:  Knee Pain (ED)


Referrals: 


Piero Jaramillo MD [Primary Care Provider] - 


Additional Instructions: 


take steroid pack


Use immobilizer


Stay off knee as much as possible


Ice, elevate, 


Ibuprofen or Tylenol every 6 hours for pain


Follow up with ortho 


Return to ED if develop or any new or worsening symptoms





- Billing Disposition and Condition


Condition: GOOD


Disposition: Home

## 2019-08-02 ENCOUNTER — HOSPITAL ENCOUNTER (EMERGENCY)
Dept: HOSPITAL 25 - ED | Age: 34
Discharge: LEFT BEFORE DISCHARGE | End: 2019-08-02
Payer: COMMERCIAL

## 2019-08-02 VITALS — DIASTOLIC BLOOD PRESSURE: 63 MMHG | SYSTOLIC BLOOD PRESSURE: 150 MMHG

## 2019-08-02 DIAGNOSIS — R10.9: Primary | ICD-10-CM

## 2019-08-02 DIAGNOSIS — Z53.21: ICD-10-CM

## 2019-08-02 NOTE — XMS REPORT
Continuity of Care Document (CCD)

 Created on:2019



Patient:Miladis Tinajero

Sex:Female

:1985

External Reference #:[id number]





Demographics







 Address  [address] [place] [address] 6



   [place] [address]

 

 Mobile Phone  [phone]

 

 Email Address  [email or web address]

 

 Preferred Language  en

 

 Marital Status  Not  or 

 

 Confucianist Affiliation  Unknown

 

 Race  Black / 

 

 Ethnic Group  Not  or 









Author







 Name  Selene Soriano









Support







 Name  Relationship  Address  Phone

 

 Ava Adkins  Unavailable  Unavailable  [phone]









Care Team Providers







 Name  Role  Phone

 

 Piero Jaramillo MD  Primary Care Physician  Unavailable









Payers







 Date  Identification Numbers  Payment Provider  Subscriber

 

 Effective: 2018  Policy Number: [id number]  [employer]  
Miladis Tinajero









 PayID: [id number]  [address] [address]









 [place] [address]









 Effective: 2018  Policy Number: [id number]  [employer]  
Miladis Tinajero









 Expires: 2018  PayID: [id number]  [address] [address]









 [place] [address]







Problems







 Active Problems  Provider  Date

 

 Closed fracture of patella  Donovan Marquez MD  Onset: 2019







Family History







 Date  Family Member(s)  Observation  Comments

 

   General  No Current Problems  







Social History







 Type  Date  Description  Comments

 

 Birth Sex    Unknown  

 

 Marital Status    Single  

 

 Lives With    Family  

 

 Occupation    Currently Working  

 

 ETOH Use    Denies alcohol use  

 

 Tobacco Use  Start: Unknown  Patient has never smoked  

 

 Recreational Drug Use    Current Drug User  marijuana use

 

 Smoking Status  Reviewed: 19  Patient has never smoked  

 

 Exercise Type/Frequency    Does not exercise  







Allergies, Adverse Reactions, Alerts







 Description

 

 No Known Drug Allergies







Medications







 Active Medications  SIG  Qnty  Indications  Ordering Provider  Date

 

 Depo-Provera  every 3 months      Unknown  



         150mg/ml          



 Suspension          



           

 

 Excedrin Migraine  1 tab every 6      Unknown  



   hours as needed        



 [id number] Tablets  for pain        



           

 

 Spironolactone  twice a day      León Baker,  



           25mg Tablets        MS, PA-C  



           









 History Medications









 Doxycycline Hyclate  1 by mouth twice  20caps    Kristopher Christy,  2017 -



   a day      M.DNadira  Unknown



 100mg Capsules          



           

 

 Doxycycline Hyclate  1 cap by mouth  28caps  L73.2  Gilson Rueda,  2016 -



   twice a day      NICKIE STANFORD  Unknown



 100mg Capsules          



           

 

 Prednisone  2 tabs by mouth  7tabs  L73.2  Costa DNadira  2016 -



           20mg  daily for 2 days      CUBA Bates  Unknown



 Tablets  then 1 tabs by        



   mouth for 3 days        

 

 Clindamycin  apply twice daily  120ml  L73.2  Costa DNadira  2016 -



 Phosphate  to affected area      CUBA Bates  Unknown



          1% Solution          



           

 

 Doxycycline Hyclate  1 cap by mouth  30caps  L73.2  Costa DNadira  2016 -



   twice a day with      CUBA Bates  Unknown



 100mg Capsules  food        



           

 

 Bactrim DS  1 by mouth twice      Unknown   -



           800-160mg  a day        2016



 Tablets          



           

 

 Oxycodone-Acetaminop  1-2 by mouth      Unknown   -



 hen  every 4-6 hours        Unknown



    5-325mg Tablets  as needed for        



   pain.        

 

 Meloxicam  Take 2 Tablets By      Unknown   -



          7.5mg  Mouth Once Daily        2019



 Tablets  For 7 Days Then        



   as Needed For        



   Pain        

 

 Doxycycline        Unknown   -



 Monohydrate          2019



            100mg          



 Capsules          



           







Medications Administered in Office







 Medication  SIG  Qnty  Indications  Ordering Provider  Date

 

 Depomedrol 40MG        Moe Tran MD  2019



       Injection          



           







Vital Signs







 Date  Vital  Result  Comment

 

 2019 10:17am  Height  64 inches  5'4"









 Weight  166.00 lb  

 

 BP Systolic  132 mmHg  

 

 BP Diastolic  78 mmHg  

 

 Respiratory Rate  17 /min  

 

 Pain Level  7  

 

 BMI (Body Mass Index)  28.5 kg/m2  









 2019 10:50am  Height  64 inches  5'4"









 Weight  172.00 lb  

 

 Heart Rate  110 /min  

 

 BP Systolic  140 mmHg  

 

 BP Diastolic  78 mmHg  

 

 Body Temperature  99.1 F  

 

 Pain Level  10  

 

 BMI (Body Mass Index)  29.5 kg/m2  









 2019  2:50pm  Height  64 inches  5'4"









 Weight  172.00 lb  

 

 BP Systolic  132 mmHg  

 

 BP Diastolic  74 mmHg  

 

 Respiratory Rate  18 /min  

 

 Pain Level  7  

 

 BMI (Body Mass Index)  29.5 kg/m2  









 05/15/2019  9:44am  Height  64 inches  5'4"









 Weight  173.00 lb  

 

 Heart Rate  90 /min  

 

 BP Systolic Sitting  104 mmHg  

 

 BP Diastolic Sitting  60 mmHg  

 

 Respiratory Rate  18 /min  

 

 Body Temperature  97.9 F  

 

 BMI (Body Mass Index)  29.7 kg/m2  









 2017 10:34am  Heart Rate  78 /min  









 Respiratory Rate  16 /min  

 

 Body Temperature  97.7 F  









 2017  1:49pm  Heart Rate  90 /min  









 Respiratory Rate  18 /min  

 

 Body Temperature  98.0 F  









 2017  8:58am  Heart Rate  72 /min  









 Respiratory Rate  16 /min  

 

 Body Temperature  97.8 F  









 2017  9:34am  Heart Rate  90 /min  









 Respiratory Rate  18 /min  

 

 Body Temperature  98.7 F  









 2017 11:32am  Heart Rate  62 /min  









 Respiratory Rate  16 /min  

 

 Body Temperature  98.8 F  









 2017 10:31am  Heart Rate  72 /min  









 BP Systolic  120 mmHg  

 

 BP Diastolic  72 mmHg  

 

 Respiratory Rate  18 /min  

 

 Body Temperature  98.1 F  









 2017  9:09am  Heart Rate  84 /min  









 BP Systolic  118 mmHg  

 

 BP Diastolic  78 mmHg  

 

 Respiratory Rate  18 /min  

 

 Body Temperature  99.1 F  









 2016 11:04am  Heart Rate  96 /min  









 BP Systolic  140 mmHg  

 

 BP Diastolic  80 mmHg  

 

 Respiratory Rate  18 /min  

 

 Body Temperature  98.6 F  









 2016 10:46am  Height  64 inches  5'4"









 Weight  194.38 lb  

 

 Heart Rate  62 /min  

 

 BP Systolic Sitting  128 mmHg  

 

 BP Diastolic Sitting  80 mmHg  

 

 Respiratory Rate  14 /min  

 

 Body Temperature  98.3 F  

 

 Pain Level  9  

 

 BMI (Body Mass Index)  33.4 kg/m2  









 2016 10:42am  Height  64 inches  5'4"









 Weight  190.00 lb  

 

 Heart Rate  72 /min  

 

 BP Systolic Sitting  110 mmHg  

 

 BP Diastolic Sitting  68 mmHg  

 

 Respiratory Rate  14 /min  

 

 Body Temperature  98.5 F  

 

 BMI (Body Mass Index)  32.6 kg/m2  







Results







 Test  Date  Facility  Test  Result  H/L  Range  Note

 

 Laboratory  20  [facility]  Surgical  SEE RESULT      1



 test finding  17  [address]  Pathology  [place]      



     [place] [address]          



     [phone]          

 

 Wound  20  [facility]  Wound/Misc  SEE RESULT      2



 Culture/Sensi  17  [address]  Culture-Gram  [place]      



     [place] [address]  Stain        



     [phone]          

 

 Laboratory  20  [facility]  Anaerobic  SEE RESULT      3



 test finding  17  [address]  Culture  [place]      



     [place] [address] [phone]          

 

 Laboratory  20  [facility]  HIV 1&2 AB  Nonreactive  Normal  
Nonreactive  4



 test finding  17  [address]  Self [place]erred        



     [place] [address] [phone]          

 

 Laboratory  20  [facility]  Pregnancy  Negative  Normal  
Negative  5



 test finding  17  [address]  (HCG) [place]rine        



     [place] [address] [phone]          

 

 CBC Auto Diff  20  [facility]  White Blood  5.8 10^3/uL  
Normal  3.5-10.8  



   16  [address]  [place]        



     [place] [address] [phone]          









 Red Blood Count  4.39 10^6/uL  Normal  4.0-5.4  

 

 Hemoglobin  12.8 g/dL  Normal  12.0-16.0  

 

 Hematocrit  39 %  Normal  35-47  

 

 Mean Corpuscular Volume  88 fL  Normal  80-97  

 

 Mean Corpuscular Hemoglobin  29 pg  Normal  27-31  

 

 Mean Corpuscular HGB Conc  33 g/dL  Normal  31-36  

 

 Red Cell Distribution Width  14 %  Normal  10.5-15  

 

 Platelet Count  259 10^3/uL  Normal  150-450  

 

 Mean Platelet Volume  7 um3  Low  7.4-10.4  

 

 Abs Neutrophils  2.3 10^3/uL  Normal  1.5-7.7  

 

 Abs Lymphocytes  3.0 10^3/uL  Normal  1.0-4.8  

 

 Abs Monocytes  0.4 10^3/uL  Normal  0-0.8  

 

 Abs Eosinophils  0.1 10^3/uL  Normal  0-0.6  

 

 Abs Basophils  0 10^3/uL  Normal  0-0.2  

 

 Abs Nucleated RBC  0.01 10^3/uL  Normal    

 

 Granulocyte %  40.1 %  Normal  38-83  

 

 Lymphocyte %  51.5 %  High  25-47  

 

 Monocyte %  6.7 %  Normal  1-9  

 

 Eosinophil %  1.0 %  Normal  0-6  

 

 Basophil %  0.7 %  Normal  0-2  

 

 Nucleated Red Blood Cells %  0.1  Normal    









 Comp Metabolic  2016  [facility]  Sodium  137 mmol/L  Normal  
133-145  



 Panel    [address]          



     [place] [address] [phone]          









 Potassium  4.2 mmol/L  Normal  3.5-5.0  

 

 Chloride  111 mmol/L  Normal  101-111  

 

 Co2 Carbon Dioxide  23 mmol/L  Normal  22-32  

 

 Anion Gap  3 mmol/L  Normal  2-11  

 

 Glucose  99 mg/dL  Normal    

 

 Blood Urea Nitrogen  6 mg/dL  Normal  6-24  

 

 Creatinine  0.80 mg/dL  Normal  0.51-0.95  

 

 BUN/Creatinine Ratio  7.5  Low  8-20  

 

 Calcium  8.6 mg/dL  Normal  8.6-10.3  

 

 Total Protein  7.7 g/dL  Normal  6.4-8.9  

 

 Albumin  3.8 g/dL  Normal  3.2-5.2  

 

 Globulin  3.9 g/dL  Normal  2-4  

 

 Albumin/Globulin Ratio  1.0  Normal  1-3  

 

 Total Bilirubin  0.20 mg/dL  Normal  0.2-1.0  

 

 Alkaline Phosphatase  37 U/L  Normal    

 

 Alt  11 U/L  Normal  7-52  

 

 Ast  15 U/L  Normal  13-39  

 

 Egfr Non-  84.2  Normal  >60  

 

 Egfr   108.3  Normal  >60  6









 Laboratory test  2016  [facility]  Hemoglobin A1c  5.9 %  
Normal  Less than  7



 finding    [address]  (Glyco HGB)      6.0  



     [place] [id number]          



     [phone]          









 1  SEE RESULT BELOW



   -----------------------------------------------------------------------------
---------------



   Name:  TINAJERONORATYESHA                     : 1985    Attend Dr: Kristopher Christy MD



   Acct:  [id number]  Unit: [id number]  AGE: 32            Location:  OR



   Re17                        SEX: F             Status:    MILVIA LONGO



   -----------------------------------------------------------------------------
---------------



   



   SPEC: [id number]             STEVEN: 17-          SUBM DR: Kristopher Christy MD



   REQ:  [id number]             RECD: 



   STATUS: SOUT



   _



   ORDERED:  LEVEL IV



   



   FINAL DIAGNOSIS



   



   



   Skin and subcutaneous tissue, left groin, resection:



   -- Hidradenitis suppurativa with abscess formation.



   -- Lymph nodes (incidental) with reactive follicular lymphoid hyperplasia.



   



   



   



   PRE-OPERATIVE DIAGNOSIS



   



   Hidradenitis suppurativa



   



   GROSS DESCRIPTION



   



   The specimen is received in formalin labeled, Hidradenitis Left Groin, and 
consists of a



   10.5 x 5.0 cm gray-brown hairbearing wrinkled and focally nodular skin 
ellipse excised to a



   maximum depth of 4.9 cm.  There is a 2.4 x 1.5 cm partially detached brown-
gray wrinkled



   portion of skin excised to a depth of 2.3 cm along one margin.  There are 
two discrete



   erythematous sinus tract measuring 2.6 by up to 0.5 cm and 3.8 by up to 0.5 
cm within the



   subcutaneous soft tissue.  There are two tan-pink lymph nodes with rare 
focal hemorrhage



   measuring 1.3 x 1.2 x 1.0 cm and 2.5 x 1.8 x 1.2 cm.  The remaining cut 
surface consists of



   yellow lobulated adipose tissue and tan-white rubbery fibrous tissue.  The 
specimen is



   inked, serially sectioned and representative sections are submitted in 
cassettes A through D



   to include lymph nodes in cassettes C and D.



   



   Signed __________(signature on file)___________ Stas Hinkle MD  [id number]



   



   -----------------------------------------------------------------------------
---------------



   



   



   



   



   



   



   



   



   ** END OF REPORT **



   



   * ML=Testing performed at Main Lab



   DEPARTMENT OF PATHOLOGY,  [address] [place] [address]



   Phone # [phone]      Fax #[phone]



   Stas Hinkle M.D. Director     [facility] # [id number]

 

 2  SEE RESULT BELOW



   -----------------------------------------------------------------------------
---------------



   Name:  MILADIS TINAJERO                     : 1985    Attend Dr: Kristopher Christy MD



   Acct:  [id number]  Unit: [id number]  AGE: 32            Location:  OR



   Re17                        SEX: F             Status:    DEP SDC



   -----------------------------------------------------------------------------
---------------



   



   SPEC: [id number]         STEVEN:       [facility] DR: Kristopher Christy MD



   REQ:  [id number]              RECD:   151



   STATUS: COMP             OTHR DR: [facility],NOPCP



   _



   SOURCE: GROIN LEFT     SPDESC:



   ORDERED:  Culture   Stain



   



   -----------------------------------------------------------------------------
---------------



   Procedure                         Result                         Reported   
        Site



   -----------------------------------------------------------------------------
---------------



   Wound/Misc Gram Stain  Final                                     17-
1600      ML



   4+ Neutrophils



   



   4+ Gram Positive Cocci



   2+ Gram Positive Bacilli



   



   Wound/Misc Culture  Final                                        17-
0953      ML



   



   Organism 1                     PEPTOSTREPTOCOCCUS ANAEROBIUS



   Quantity                    1+



   



   WITH POSSIBLE ADDITIONAL ANAEROBES



   - UNABLE TO ISOLATE FOR FURTHER IDENTIFICATION.



   



   Anaerobic sensitivities are not routinely performed.



   Positive isolates will be saved for one week.  Please call



   the Microbiology Laboratory if susceptibility testing is



   needed.



   



   -----------------------------------------------------------------------------
---------------



   * ML - MAIN LAB ([facility])



   .



   



   



   



   



   



   



   



   



   



   



   



   ** END OF REPORT **



   



   * ML=Testing performed at Main Lab



   DEPARTMENT OF PATHOLOGY,  [address] [place] [address]



   Phone # [phone]      Fax #[phone]



   Stas Hinkle M.D. Director     [facility] # [id number]

 

 3  SEE RESULT BELOW



   -----------------------------------------------------------------------------
---------------



   Name:  TINAJERO,MILADIS                     : 1985    Attend Dr: Kristopher Christy MD



   Acct:  [id number]  Unit: [id number]  AGE: 32            Location:  OR



   Re17                        SEX: F             Status:    MILVIA LONGO



   -----------------------------------------------------------------------------
---------------



   



   SPEC: [id number]         STEVEN:   17-0    [facility] DR: Kristopher Christy MD



   REQ:  [id number]              RECD:   



   STATUS: COMP             OTHR DR: Diana [facility] Phys,NOPCP



   _



   SOURCE: WOUND          SPDESC:OTHER



   ORDERED:  Anaerobic Cult



   COMMENTS: LEFT GROIN



   



   -----------------------------------------------------------------------------
---------------



   Procedure                         Result                         Reported   
        Site



   -----------------------------------------------------------------------------
---------------



   Anaerobic Culture  Final                                         17-
953      ML



   



   Organism 1                     PEPTOSTREPTOCOCCUS ANAEROBIUS



   Quantity                    2+



   



   WITH POSSIBLE ADDITIONAL ANAEROBES



   - UNABLE TO ISOLATE FOR FURTHER IDENTIFICATION.



   



   -----------------------------------------------------------------------------
---------------



   * ML - MAIN LAB ([facility])



   .



   



   



   



   



   



   



   



   



   



   



   



   



   



   



   



   



   



   



   



   



   



   ** END OF REPORT **



   



   * ML=Testing performed at Main Lab



   DEPARTMENT OF PATHOLOGY,  [address] [place] [address]



   Phone # [phone]      Fax #[phone]



   Stas Hinkle M.D. Director     [facility] # [id number]

 

 4  It is recognized that currently available assays for the



   detection of antibodies to HIV-1 and/or HIV-2 may not detect



   all infected individuals. HIV antibodies may be undetectable



   in some stages of the infection and in some clinical



   conditions. The performance of this assay has not been



   established for populations of infants or children.



   



   Assayed by Chemiluminescence Microparticle Immunoassay on



   the Siemens Advia Centaur CP. Values obtained with different



   methods or kits cannot be used interchangeably.The



   diagnostic specificity of the ADVIA Centaur 1/O/2 Enhanced



   assay in the low risk population was 99.90% (6052/6058) with



   a 95% confidence interval of 99.78 to 99.96%.

 

 5  If pregnancy is still suspected, please repeat test after



   48 to 72 hours.



   This test detects intact HCG only and is indicated for the



   early detection of pregnancy.

 

 6  *******Because ethnic data is not always readily available,



   this report includes an eGFR for both -Americans and



   non- Americans.****



   The National Kidney Disease Education Program (NKDEP) does



   not endorse the use of the MDRD equation for patients that



   are not between the ages of 18 and 70, are pregnant, have



   extremes of body size, muscle mass, or nutritional status,



   or are non- or non-.



   According to the National Kidney Foundation, irrespective of



   diagnosis, the stage of the disease is based on the level of



   kidney function:



   Stage Description                      GFR(mL/min/1.73 m(2))



   1     Kidney damage with normal or decreased GFR       90



   2     Kidney damage with mild decrease in GFR          60-89



   3     Moderate decrease in GFR                         30-59



   4     Severe decrease in GFR                           15-29



   5     Kidney failure                       <15 (or dialysis)

 

 7  Therapeutic target for the treatment of diabetes



   Mellitus patients is <7% HBA1C, and in selective



   patients <6.0%.Please refer to American Diabetes



   Association Diabetic care guidelines for further



   information.







Procedures







 Date  Code  Description  Status

 

 2019  87850  Inject/Drain Joint/Bursa Major W/O US  Completed

 

 2019  48857  Esophagogastroduodenoscopy, diagnostic, incl brush/wash if  
Completed



     perfor  

 

 2017  85319  Excise Sweat Gland Lesion Inguinal Complex  Completed

 

 2017  29992  Excise Sweat Gland Lesion Inguinal Complex  Completed







Encounters







 Type  Date  Location  Provider  Dx  Diagnosis

 

 Office Visit  2019  Orthopedic  Donovan Marquez MD  M25.562  Pain in left 
knee



   10:30a  [facility]      









 S80.02xD  Contusion of left knee, subsequent encounter

 

 M25.462  Effusion, left knee









 Office Visit  2019  2:30p  Orthopedic  Moe F  M25.562  Pain in left



     [facility] Tran MD    knee









 S80.02xA  Contusion of left knee, initial encounter









 Office Visit  05/15/2019  Surgical  Davin AGUILAR  K21.0  Gastro-esophageal



   9:30a  [facility]  MD Darling    reflux disease with



     [facility]      esophagitis









 R13.10  Dysphagia, unspecified









 Office Visit  2017  9:00a  Surgical  Kristopher GROVER.2  Hidradenitis



     [facility]  CUBA Christy    suppurativa

 

 Office Visit  2016 11:00a  Surgical  REILLY Macias.2  Hidradenitis



     [facility]  NICKIE STANFORD    suppurativa

 

 Office Visit  2016 10:30a  [facility]  Costa ACEVEDO73.2  
Hidradenitis



     Infectious  CUBA Bates    suppurativa



     Diseases      









 M79.605  Pain in left leg









 Office Visit  2016 10:30a  [facility]  Costa ACEVEDO73.2  Hidradenitis



     For Infectious  CUBA Bates    suppurativa



     Diseases      







Plan of Treatment

Future Appointment(s):2019 10:00 am - Donovan Marquez MD at [facility] - Donovan Marquez, MDS82.002A Unspecified fracture of 
left patella, initial encounter for closed fractureFollow up:Follow up:   3-4 
weeks

## 2019-08-02 NOTE — XMS REPORT
Continuity of Care Document (CCD)

 Created on:2019



Patient:Miladis Tinajero

Sex:Female

:1985

External Reference #:[id number]





Demographics







 Address  [address] [place] [address] 6



   [place] [address]

 

 Mobile Phone  [phone]

 

 Email Address  [email or web address]

 

 Preferred Language  en

 

 Marital Status  Not  or 

 

 Sikh Affiliation  Unknown

 

 Race  Black / 

 

 Ethnic Group  Not  or 









Author







 Name  Selene Soriano









Support







 Name  Relationship  Address  Phone

 

 Ava Adkins  Unavailable  Unavailable  [phone]









Care Team Providers







 Name  Role  Phone

 

 Piero Jaramillo MD  Primary Care Physician  Unavailable









Payers







 Date  Identification Numbers  Payment Provider  Subscriber

 

 Effective: 2018  Policy Number: [id number]  [employer]  
Miladis Tinajero









 PayID: [id number]  [address] [address]









 [place] [address]









 Effective: 2018  Policy Number: [id number]  [employer]  
Miladis Tinajero









 Expires: 2018  PayID: [id number]  [address] [address]









 [place] [address]







Family History







 Date  Family Member(s)  Observation  Comments

 

   General  No Current Problems  







Social History







 Type  Date  Description  Comments

 

 Birth Sex    Unknown  

 

 Marital Status    Single  

 

 Lives With    Family  

 

 Occupation    Currently Working  

 

 ETOH Use    Denies alcohol use  

 

 Tobacco Use  Start: Unknown  Patient has never smoked  

 

 Recreational Drug Use    Current Drug User  marijuana use

 

 Smoking Status  Reviewed: 19  Patient has never smoked  

 

 Exercise Type/Frequency    Does not exercise  







Allergies, Adverse Reactions, Alerts







 Description

 

 No Known Drug Allergies







Medications







 Active Medications  SIG  Qnty  Indications  Ordering Provider  Date

 

 Depo-Provera  every 3 months      Unknown  



         150mg/ml          



 Suspension          



           

 

 Excedrin Migraine  1 tab every 6      Unknown  



   hours as needed        



 [id number] Tablets  for pain        



           

 

 Spironolactone  twice a day      León Baker,  



           25mg Tablets        AURORA MELGAR  



           

 

 Meloxicam  Take 2 Tablets      Unknown  



      7.5mg Tablets  By Mouth Once        



   Daily For 7 Days        



   Then as Needed        



   For Pain        

 

 Doxycycline Monohydrate        Unknown  



                    100mg          



 Capsules          



           









 History Medications









 Doxycycline Hyclate  1 by mouth twice  20caps    Kristopher Christy,  2017 -



   a day      M.DNadira  Unknown



 100mg Capsules          



           

 

 Doxycycline Hyclate  1 cap by mouth  28caps  L73.2  Gilson Tobiasbeatrizs,  2016 -



   twice a day      NICKIE STANFORD  Unknown



 100mg Capsules          



           

 

 Prednisone  2 tabs by mouth  7tabs  L73.2  Costa CRAFT  2016 -



           20mg  daily for 2 days      CUBA Bates  Unknown



 Tablets  then 1 tabs by        



   mouth for 3 days        

 

 Clindamycin  apply twice daily  120ml  L73.2  Costa CRAFT  2016 -



 Phosphate  to affected area      CUBA Bates  Unknown



          1% Solution          



           

 

 Doxycycline Hyclate  1 cap by mouth  30caps  L73.2  Costa DNadira  2016 -



   twice a day with      CUBA Bates  Unknown



 100mg Capsules  food        



           

 

 Bactrim DS  1 by mouth twice      Unknown   -



           800-160mg  a day        2016



 Tablets          



           

 

 Oxycodone-Acetaminop  1-2 by mouth      Unknown   -



 hen  every 4-6 hours        Unknown



    5-325mg Tablets  as needed for        



   pain.        







Medications Administered in Office







 Medication  SIG  Qnty  Indications  Ordering Provider  Date

 

 Depomedrol 40MG        Moe Tran MD  2019



       Injection          



           







Vital Signs







 Date  Vital  Result  Comment

 

 2019 10:50am  Height  64 inches  5'4"









 Weight  172.00 lb  

 

 Heart Rate  110 /min  

 

 BP Systolic  140 mmHg  

 

 BP Diastolic  78 mmHg  

 

 Body Temperature  99.1 F  

 

 Pain Level  10  

 

 BMI (Body Mass Index)  29.5 kg/m2  









 2019  2:50pm  Height  64 inches  5'4"









 Weight  172.00 lb  

 

 BP Systolic  132 mmHg  

 

 BP Diastolic  74 mmHg  

 

 Respiratory Rate  18 /min  

 

 Pain Level  7  

 

 BMI (Body Mass Index)  29.5 kg/m2  









 05/15/2019  9:44am  Height  64 inches  5'4"









 Weight  173.00 lb  

 

 Heart Rate  90 /min  

 

 BP Systolic Sitting  104 mmHg  

 

 BP Diastolic Sitting  60 mmHg  

 

 Respiratory Rate  18 /min  

 

 Body Temperature  97.9 F  

 

 BMI (Body Mass Index)  29.7 kg/m2  









 2017 10:34am  Heart Rate  78 /min  









 Respiratory Rate  16 /min  

 

 Body Temperature  97.7 F  









 2017  1:49pm  Heart Rate  90 /min  









 Respiratory Rate  18 /min  

 

 Body Temperature  98.0 F  









 2017  8:58am  Heart Rate  72 /min  









 Respiratory Rate  16 /min  

 

 Body Temperature  97.8 F  









 2017  9:34am  Heart Rate  90 /min  









 Respiratory Rate  18 /min  

 

 Body Temperature  98.7 F  









 2017 11:32am  Heart Rate  62 /min  









 Respiratory Rate  16 /min  

 

 Body Temperature  98.8 F  









 2017 10:31am  Heart Rate  72 /min  









 BP Systolic  120 mmHg  

 

 BP Diastolic  72 mmHg  

 

 Respiratory Rate  18 /min  

 

 Body Temperature  98.1 F  









 2017  9:09am  Heart Rate  84 /min  









 BP Systolic  118 mmHg  

 

 BP Diastolic  78 mmHg  

 

 Respiratory Rate  18 /min  

 

 Body Temperature  99.1 F  









 2016 11:04am  Heart Rate  96 /min  









 BP Systolic  140 mmHg  

 

 BP Diastolic  80 mmHg  

 

 Respiratory Rate  18 /min  

 

 Body Temperature  98.6 F  









 2016 10:46am  Height  64 inches  5'4"









 Weight  194.38 lb  

 

 Heart Rate  62 /min  

 

 BP Systolic Sitting  128 mmHg  

 

 BP Diastolic Sitting  80 mmHg  

 

 Respiratory Rate  14 /min  

 

 Body Temperature  98.3 F  

 

 Pain Level  9  

 

 BMI (Body Mass Index)  33.4 kg/m2  









 2016 10:42am  Height  64 inches  5'4"









 Weight  190.00 lb  

 

 Heart Rate  72 /min  

 

 BP Systolic Sitting  110 mmHg  

 

 BP Diastolic Sitting  68 mmHg  

 

 Respiratory Rate  14 /min  

 

 Body Temperature  98.5 F  

 

 BMI (Body Mass Index)  32.6 kg/m2  







Results







 Test  Date  Facility  Test  Result  H/L  Range  Note

 

 Laboratory test    [facility]  Surgical  SEE RESULT      1



 finding  7  [address]  Pathology  BELOW      



     [place], [place] [address] [phone]          

 

 Wound    [facility]  Wound/Misc  SEE RESULT      2



 Culture/Sensi  7  [address]  Culture-Gram  BELOW      



     [place] [address]  Stain        



     [phone]          

 

 Laboratory test    [facility]  Anaerobic  SEE RESULT      3



 finding  7  [address]  Culture  BELOW      



     [place] [address]          



     [phone]          

 

 Laboratory test    [facility]  HIV 1&2 AB  Nonreactive  N  
Nonreactive  4



 finding  7  [address]  Self Referred        



     [place] [address]          



     [phone]          

 

 Laboratory test    [facility]  Pregnancy  Negative  N  
Negative  5



 finding  7  [address]  (HCG) Urine        



     [place] [address] [phone]          

 

 CBC Auto Diff    [facility]  White Blood  5.8 10^3/uL  N  
3.5-10.8  



   6  [address]  Count        



     [place] [address] [phone]          









 Red Blood Count  4.39 10^6/uL  N  4.0-5.4  

 

 Hemoglobin  12.8 g/dL  N  12.0-16.0  

 

 Hematocrit  39 %  N  35-47  

 

 Mean Corpuscular Volume  88 fL  N  80-97  

 

 Mean Corpuscular Hemoglobin  29 pg  N  27-31  

 

 Mean Corpuscular HGB Conc  33 g/dL  N  31-36  

 

 Red Cell Distribution Width  14 %  N  10.5-15  

 

 Platelet Count  259 10^3/uL  N  150-450  

 

 Mean Platelet Volume  7 um3  Low  7.4-10.4  

 

 Abs Neutrophils  2.3 10^3/uL  N  1.5-7.7  

 

 Abs Lymphocytes  3.0 10^3/uL  N  1.0-4.8  

 

 Abs Monocytes  0.4 10^3/uL  N  0-0.8  

 

 Abs Eosinophils  0.1 10^3/uL  N  0-0.6  

 

 Abs Basophils  0 10^3/uL  N  0-0.2  

 

 Abs Nucleated RBC  0.01 10^3/uL  N    

 

 Granulocyte %  40.1 %  N  38-83  

 

 Lymphocyte %  51.5 %  High  25-47  

 

 Monocyte %  6.7 %  N  1-9  

 

 Eosinophil %  1.0 %  N  0-6  

 

 Basophil %  0.7 %  N  0-2  

 

 Nucleated Red Blood Cells %  0.1  N    









 Comp Metabolic Panel  2016  [facility]  Sodium  137 mmol/L  N
  133-145  



     [address]          



     [place] [address] [phone]          









 Potassium  4.2 mmol/L  N  3.5-5.0  

 

 Chloride  111 mmol/L  N  101-111  

 

 Co2 Carbon Dioxide  23 mmol/L  N  22-32  

 

 Anion Gap  3 mmol/L  N  2-11  

 

 Glucose  99 mg/dL  N    

 

 Blood Urea Nitrogen  6 mg/dL  N  6-24  

 

 Creatinine  0.80 mg/dL  N  0.51-0.95  

 

 BUN/Creatinine Ratio  7.5  Low  8-20  

 

 Calcium  8.6 mg/dL  N  8.6-10.3  

 

 Total Protein  7.7 g/dL  N  6.4-8.9  

 

 Albumin  3.8 g/dL  N  3.2-5.2  

 

 Globulin  3.9 g/dL  N  2-4  

 

 Albumin/Globulin Ratio  1.0  N  1-3  

 

 Total Bilirubin  0.20 mg/dL  N  0.2-1.0  

 

 Alkaline Phosphatase  37 U/L  N    

 

 Alt  11 U/L  N  7-52  

 

 Ast  15 U/L  N  13-39  

 

 Egfr Non-  84.2  N  >60  

 

 Egfr   108.3  N  >60  6









 Laboratory test  2016  [facility]  Hemoglobin A1c  5.9 %  N  
Less than  7



 finding    [address]  (Glyco HGB)      6.0  



     [place] [address]          



     [phone]          









 1  SEE RESULT BELOW



   -----------------------------------------------------------------------------
---------------



   Name:  MILADIS TINAJERO                     : 1985    Attend Dr: Kristopher Christy MD



   Acct:  [id number]  Unit: [id number]  AGE: 32            Location:  OR



   Re17                        SEX: F             Status:    MILVIA [facility]



   -----------------------------------------------------------------------------
---------------



   



   SPEC: [id number]             STEVEN: 17-          [facility] DR: Kristopher Christy MD



   REQ:  [id number]             RECD: 



   STATUS: SOUT



   _



   ORDERED:  LEVEL IV



   



   FINAL DIAGNOSIS



   



   



   Skin and subcutaneous tissue, left groin, resection:



   -- Hidradenitis suppurativa with abscess formation.



   -- Lymph nodes (incidental) with reactive follicular lymphoid hyperplasia.



   



   



   



   PRE-OPERATIVE DIAGNOSIS



   



   Hidradenitis suppurativa



   



   GROSS DESCRIPTION



   



   The specimen is received in formalin labeled, Hidradenitis Left Groin, and 
consists of a



   10.5 x 5.0 cm gray-brown hairbearing wrinkled and focally nodular skin 
ellipse excised to a



   maximum depth of 4.9 cm.  There is a 2.4 x 1.5 cm partially detached brown-
gray wrinkled



   portion of skin excised to a depth of 2.3 cm along one margin.  There are 
two discrete



   erythematous sinus tract measuring 2.6 by up to 0.5 cm and 3.8 by up to 0.5 
cm within the



   subcutaneous soft tissue.  There are two tan-pink lymph nodes with rare 
focal hemorrhage



   measuring 1.3 x 1.2 x 1.0 cm and 2.5 x 1.8 x 1.2 cm.  The remaining cut 
surface consists of



   yellow lobulated adipose tissue and tan-white rubbery fibrous tissue.  The 
specimen is



   inked, serially sectioned and representative sections are submitted in 
cassettes A through D



   to include lymph nodes in cassettes C and D.



   



   Signed __________(signature on file)___________ Stas Hinkle MD  [id number]



   



   -----------------------------------------------------------------------------
---------------



   



   



   



   



   



   



   



   



   ** END OF REPORT **



   



   * ML=Testing performed at Main Lab



   DEPARTMENT OF PATHOLOGY,  [address] [place] [address]



   Phone # [phone]      Fax #[phone]



   Stas Hinkle M.D. Director     [facility] # [id number]

 

 2  SEE RESULT BELOW



   -----------------------------------------------------------------------------
---------------



   Name:  MILADIS TINAJERO                     : 1985    Attend Dr: Kristopher Christy MD



   Acct:  [id number]  Unit: [id number]  AGE: 32            Location:  OR



   Re17                        SEX: F             Status:    MILVIA SDC



   -----------------------------------------------------------------------------
---------------



   



   SPEC: [id number]         STEVEN:       SUBM DR: Kristopher Christy MD



   REQ:  [id number]              RECD:   



   STATUS: COMP             OTHR DR: Diana [facility]



   _



   SOURCE: GROIN LEFT     \[facility]\""ESC:



   ORDERED:  Culture   Stain



   



   -----------------------------------------------------------------------------
---------------



   Procedure                         Result                         Reported   
        Site



   -----------------------------------------------------------------------------
---------------



   Wound/Misc Gram Stain  Final                                     17-
1600      ML



   4+ Neutrophils



   



   4+ Gram Positive Cocci



   2+ Gram Positive Bacilli



   



   Wound/Misc Culture  Final                                        17-
0953      ML



   



   Organism 1                     PEPTOSTREPTOCOCCUS ANAEROBIUS



   Quantity                    1+



   



   WITH POSSIBLE ADDITIONAL ANAEROBES



   - UNABLE TO ISOLATE FOR FURTHER IDENTIFICATION.



   



   Anaerobic sensitivities are not routinely performed.



   Positive isolates will be saved for one week.  Please call



   the Microbiology Laboratory if susceptibility testing is



   needed.



   



   -----------------------------------------------------------------------------
---------------



   * ML - MAIN LAB ([facility])



   .



   



   



   



   



   



   



   



   



   



   



   



   ** END OF REPORT **



   



   * ML=Testing performed at Main Lab



   DEPARTMENT OF PATHOLOGY,  [address] [place] [address]



   Phone # [phone]      Fax #[phone]



   Stas Hinkle M.D. Director     [facility] # [id number]

 

 3  SEE RESULT BELOW



   -----------------------------------------------------------------------------
---------------



   Name:  MILADIS TINAJERO                     : 1985    Attend Dr: Kristopher Christy MD



   Acct:  [id number]  Unit: [id number]  AGE: 32            Location:  OR



   Re17                        SEX: F             Status:    DEP SDC



   -----------------------------------------------------------------------------
---------------



   



   SPEC: [id number]         STEVEN:       [facility] DR: Kristopher Christy MD



   REQ:  [id number]              RECD:   [id number]



   STATUS: COMP             OTHR DR: No Primary Care Phys,NOPCP



   _



   SOURCE: WOUND          SPDESC:OTHER



   ORDERED:  Anaerobic Cult



   COMMENTS: LEFT GROIN



   



   -----------------------------------------------------------------------------
---------------



   Procedure                         Result                         Reported   
        Site



   -----------------------------------------------------------------------------
---------------



   Anaerobic Culture  Final                                         17-
0953      ML



   



   Organism 1                     PEPTOSTREPTOCOCCUS ANAEROBIUS



   Quantity                    2+



   



   WITH POSSIBLE ADDITIONAL ANAEROBES



   - UNABLE TO ISOLATE FOR FURTHER IDENTIFICATION.



   



   -----------------------------------------------------------------------------
---------------



   * ML - MAIN LAB ([facility])



   .



   



   



   



   



   



   



   



   



   



   



   



   



   



   



   



   



   



   



   



   



   



   ** END OF REPORT **



   



   * ML=Testing performed at Main Lab



   DEPARTMENT OF PATHOLOGY,  [address] [place] [address]



   Phone # [phone]      Fax #[phone]



   Stas Hinkle M.D. Director     [facility] # [id number]

 

 4  It is recognized that currently available assays for the



   detection of antibodies to HIV-1 and/or HIV-2 may not detect



   all infected individuals. HIV antibodies may be undetectable



   in some stages of the infection and in some clinical



   conditions. The performance of this assay has not been



   established for populations of infants or children.



   



   Assayed by Chemiluminescence Microparticle Immunoassay on



   the Siemens AdvHeadroomaur CP. Values obtained with different



   methods or kits cannot be used interchangeably.The



   diagnostic specificity of the ADVIA Centaur 1/O/2 Enhanced



   assay in the low risk population was 99.90% (6052/6058) with



   a 95% confidence interval of 99.78 to 99.96%.

 

 5  If pregnancy is still suspected, please repeat test after



   48 to 72 hours.



   This test detects intact HCG only and is indicated for the



   early detection of pregnancy.

 

 6  *******Because ethnic data is not always readily available,



   this report includes an eGFR for both -Americans and



   non- Americans.****



   The National Kidney Disease Education Program (NKDEP) does



   not endorse the use of the MDRD equation for patients that



   are not between the ages of 18 and 70, are pregnant, have



   extremes of body size, muscle mass, or nutritional status,



   or are non- or non-.



   According to the National Kidney Foundation, irrespective of



   diagnosis, the stage of the disease is based on the level of



   kidney function:



   Stage Description                      GFR(mL/min/1.73 m(2))



   1     Kidney damage with normal or decreased GFR       90



   2     Kidney damage with mild decrease in GFR          60-89



   3     Moderate decrease in GFR                         30-59



   4     Severe decrease in GFR                           15-29



   5     Kidney failure                       <15 (or dialysis)

 

 7  Therapeutic target for the treatment of diabetes



   Mellitus patients is <7% HBA1C, and in selective



   patients <6.0%.Please refer to American Diabetes



   Association Diabetic care guidelines for further



   information.







Procedures







 Date  Code  Description  Status

 

 2019  41841  Inject/Drain Joint/Bursa Major W/O US  Completed

 

 2019  89234  Esophagogastroduodenoscopy, diagnostic, incl brush/wash if  
Completed



     perfor  

 

 2017  26976  Excise Sweat Gland Lesion Inguinal Complex  Completed

 

 2017  31333  Excise Sweat Gland Lesion Inguinal Complex  Completed







Encounters







 Type  Date  Location  Provider  Dx  Diagnosis

 

 Office Visit  05/15/2019  Surgical  Davin AGUILAR  K21.0  Gastro-esophageal



   9:30a  [facility]  MD Darling    reflux disease with



           esophagitis









 R13.10  Dysphagia, unspecified









 Office Visit  2017  9:00a  Surgical  Kristopher ACEVEDO73.2  Hidradenitis



     [facility] Christy M.D.    suppurativa

 

 Office Visit  2016 11:00a  Surgical  REILLY Macias.2  Hidradenitis



     [facility]  MD, FACS    suppurativa

 

 Office Visit  2016 10:30a  [facility]  [place] ACEVEDO73.2  
Hidradenitis



     Infectious  CUBA Bates    suppurativa



     Diseases      









 M79.605  Pain in left leg









 Office Visit  2016 10:30a  [facility]  [place] ACEVEDO73.2  Hidradenitis



     For Infectious  CUBA Bates    suppurativa



     Diseases      







Plan of Treatment

2019 - Donovan Marquez, MDM25.562 Pain in left kneeS80.02xD Contusion of 
left knee, subsequent fajcdqcsaE43.462 Effusion, left kneeNew Xrays:MRI Knee 
Left W/O, Ordered: 19Follow up:Follow up:   after MRI

## 2019-08-10 ENCOUNTER — HOSPITAL ENCOUNTER (EMERGENCY)
Dept: HOSPITAL 25 - ED | Age: 34
Discharge: HOME | End: 2019-08-10
Payer: SELF-PAY

## 2019-08-10 VITALS — SYSTOLIC BLOOD PRESSURE: 108 MMHG | DIASTOLIC BLOOD PRESSURE: 80 MMHG

## 2019-08-10 DIAGNOSIS — R05: ICD-10-CM

## 2019-08-10 DIAGNOSIS — R10.84: ICD-10-CM

## 2019-08-10 DIAGNOSIS — Z87.891: ICD-10-CM

## 2019-08-10 DIAGNOSIS — R06.02: ICD-10-CM

## 2019-08-10 DIAGNOSIS — G43.909: ICD-10-CM

## 2019-08-10 DIAGNOSIS — R07.89: Primary | ICD-10-CM

## 2019-08-10 LAB
ALBUMIN SERPL BCG-MCNC: 3.7 G/DL (ref 3.2–5.2)
ALBUMIN/GLOB SERPL: 0.7 {RATIO} (ref 1–3)
ALP SERPL-CCNC: 36 U/L (ref 34–104)
ALT SERPL W P-5'-P-CCNC: 7 U/L (ref 7–52)
ANION GAP SERPL CALC-SCNC: 7 MMOL/L (ref 2–11)
AST SERPL-CCNC: 9 U/L (ref 13–39)
BASOPHILS # BLD AUTO: 0 10^3/UL (ref 0–0.2)
BUN SERPL-MCNC: 6 MG/DL (ref 6–24)
BUN/CREAT SERPL: 9.7 (ref 8–20)
CALCIUM SERPL-MCNC: 9.2 MG/DL (ref 8.6–10.3)
CHLORIDE SERPL-SCNC: 104 MMOL/L (ref 101–111)
EOSINOPHIL # BLD AUTO: 0 10^3/UL (ref 0–0.6)
GLOBULIN SER CALC-MCNC: 5.6 G/DL (ref 2–4)
GLUCOSE SERPL-MCNC: 106 MG/DL (ref 70–100)
HCG SERPL QL: < 0.6 MIU/ML
HCO3 SERPL-SCNC: 26 MMOL/L (ref 22–32)
HCT VFR BLD AUTO: 32 % (ref 35–47)
HGB BLD-MCNC: 11.1 G/DL (ref 12–16)
LYMPHOCYTES # BLD AUTO: 1.9 10^3/UL (ref 1–4.8)
MCH RBC QN AUTO: 30 PG (ref 27–31)
MCHC RBC AUTO-ENTMCNC: 34 G/DL (ref 31–36)
MCV RBC AUTO: 86 FL (ref 80–97)
MONOCYTES # BLD AUTO: 0.6 10^3/UL (ref 0–0.8)
NEUTROPHILS # BLD AUTO: 4.4 10^3/UL (ref 1.5–7.7)
NRBC # BLD AUTO: 0 10^3/UL
NRBC BLD QL AUTO: 0
PLATELET # BLD AUTO: 387 10^3/UL (ref 150–450)
POTASSIUM SERPL-SCNC: 3.5 MMOL/L (ref 3.5–5)
PROT SERPL-MCNC: 9.3 G/DL (ref 6.4–8.9)
RBC # BLD AUTO: 3.76 10^6 /UL (ref 3.7–4.87)
SODIUM SERPL-SCNC: 137 MMOL/L (ref 135–145)
TROPONIN I SERPL-MCNC: 0 NG/ML (ref ?–0.04)
WBC # BLD AUTO: 6.9 10^3/UL (ref 3.5–10.8)

## 2019-08-10 PROCEDURE — 71046 X-RAY EXAM CHEST 2 VIEWS: CPT

## 2019-08-10 PROCEDURE — 80053 COMPREHEN METABOLIC PANEL: CPT

## 2019-08-10 PROCEDURE — 93005 ELECTROCARDIOGRAM TRACING: CPT

## 2019-08-10 PROCEDURE — 84702 CHORIONIC GONADOTROPIN TEST: CPT

## 2019-08-10 PROCEDURE — 71275 CT ANGIOGRAPHY CHEST: CPT

## 2019-08-10 PROCEDURE — 83690 ASSAY OF LIPASE: CPT

## 2019-08-10 PROCEDURE — 86140 C-REACTIVE PROTEIN: CPT

## 2019-08-10 PROCEDURE — 99282 EMERGENCY DEPT VISIT SF MDM: CPT

## 2019-08-10 PROCEDURE — 85025 COMPLETE CBC W/AUTO DIFF WBC: CPT

## 2019-08-10 PROCEDURE — 83605 ASSAY OF LACTIC ACID: CPT

## 2019-08-10 PROCEDURE — 96374 THER/PROPH/DIAG INJ IV PUSH: CPT

## 2019-08-10 PROCEDURE — 84484 ASSAY OF TROPONIN QUANT: CPT

## 2019-08-10 PROCEDURE — 85379 FIBRIN DEGRADATION QUANT: CPT

## 2019-08-10 PROCEDURE — 36415 COLL VENOUS BLD VENIPUNCTURE: CPT

## 2019-08-10 NOTE — ED
Shortness of Breath





- HPI Summary


HPI Summary: 


34 year old female presents with shortness breath for past week.  She states 

the pain is located on bilateral ribs.  She admits to chest tightness.  She 

states is better when she sits up.  She states she has been having a cough.  No 

fevers.  admits to occasional vomiting or diarrhea.  She did injure her knee a 

couple weeks ago and has been using an immobilizer.  She denies any family 

history of blood clots.  She does smoke marijuana.  No fam history of cardiac 

disease. no recent travel. pain is often sharp and brief in her ribs. admits to 

generalized abd pain in addition.  





- History of Current Complaint


Chief Complaint: EDShortnessOfBreath


Time Seen by Provider: 08/10/19 17:27





- Allergy/Home Medications


Allergies/Adverse Reactions: 


 Allergies











Allergy/AdvReac Type Severity Reaction Status Date / Time


 


No Known Allergies Allergy   Verified 08/10/19 17:19











Home Medications: 


 Home Medications





Ibuprofen 600 mg PO Q6HR PRN 08/10/19 [History Confirmed 08/10/19]











PMH/Surg Hx/FS Hx/Imm Hx


Endocrine/Hematology History: 


   Denies: Hx Anticoagulant Therapy, Hx Diabetes


Cardiovascular History: 


   Denies: Hx Hypertension, Hx Pacemaker/ICD


Respiratory History: 


   Denies: Hx Asthma, Hx Chronic Obstructive Pulmonary Disease (COPD)


GI History: Reports: Hx Gastroesophageal Reflux Disease - only during pregnancy


 History: 


   Denies: Hx Renal Disease


Sensory History: Reports: Hx Contacts or Glasses - contacts, will wear glasses 

day of surgery


   Denies: Hx Hearing Aid


Opthamlomology History: Reports: Hx Contacts or Glasses - contacts, will wear 

glasses day of surgery


Neurological History: Reports: Hx Migraine - takes prn excedrin


Psychiatric History: 


   Denies: Hx Panic Disorder





- Surgical History


Surgery Procedure, Year, and Place: leep procedure , 15 yrs ago - [place].

  bilateral axillary hidrandenitis - 2 yrs ago - [place]


Hx Anesthesia Reactions: No


Infectious Disease History: No


Infectious Disease History: Reports: Hx of Known/Suspected MRSA - groin abcess


   Denies: History Other Infectious Disease, Traveled Outside the [place] in Last 30 

Days





- Family History


Known Family History: Positive: Diabetes


   Negative: Cardiac Disease, Hypertension, Blood Disorder





- Social History


Alcohol Use: Occasionally


Hx Substance Use: Yes


Substance Use Type: Reports: Marijuana


Substance Use Comment - Amount & Last Used: occ.


Hx Tobacco Use: No


Smoking Status (MU): Former Smoker


Have You Smoked in the Last Year: No





Review of Systems


Negative: Fever


Positive: Chest Pain


Positive: Shortness Of Breath, Cough


Positive: Abdominal Pain.  Negative: Vomiting, Diarrhea


All Other Systems Reviewed And Are Negative: Yes





Physical Exam


Triage Information Reviewed: Yes


Vital Signs On Initial Exam: 


 Initial Vitals











Temp Pulse Resp BP Pulse Ox


 


 99.0 F   92   16   114/72   98 


 


 08/10/19 17:13  08/10/19 17:13  08/10/19 17:13  08/10/19 17:13  08/10/19 17:13











Vital Signs Reviewed: Yes


Appearance: Positive: Well-Appearing


Skin: Positive: Warm, Dry


Head/Face: Positive: Normal Head/Face Inspection


Eyes: Positive: Normal, EOMI, FLORA, Conjunctiva Clear


ENT: Positive: Normal ENT inspection, Pharynx normal, TMs normal


Respiratory/Lung Sounds: Positive: Clear to Auscultation, Breath Sounds Present

, Other - tenderness chest wall


Cardiovascular: Positive: Normal, RRR


Abdomen Description: Positive: Soft, Other: - diffuse abd pain


Bowel Sounds: Positive: Present


Musculoskeletal: Positive: Normal


Neurological: Positive: Normal


Psychiatric: Positive: Normal





Diagnostics





- Vital Signs


 Vital Signs











  Temp Pulse Resp BP Pulse Ox


 


 08/10/19 17:13  99.0 F  92  16  114/72  98














- Laboratory


Result Diagrams: 


 08/10/19 17:48





 08/10/19 17:48


Lab Statement: Any lab studies that have been ordered have been reviewed, and 

results considered in the medical decision making process.





- Radiology


  ** chest


Radiology Interpretation Completed By: ED Physician


Summary of Radiographic Findings: no acute disease





- CT


  ** chest


CT Interpretation Completed By: Radiologist


Summary of CT Findings: IMPRESSION:  No pulmonary emboli. No additional 

findings to correlate with patient's.  symptomatology.





- EKG


  ** No standard instances


Cardiac Rate: NL


EKG Rhythm: Sinus Rhythm


Summary of EKG Findings: sinus rhythm





Course/Dx





- Course


Course Of Treatment: 34 year old female presents with shortness breath for past 

week.  She states the pain is located on bilateral ribs.  She admits to chest 

tightness.  She states is better when she sits up.  She states she has been 

having a cough.  No fevers.  admits to occasional vomiting or diarrhea.  She 

did injure her knee a couple weeks ago and has been using an immobilizer.  She 

denies any family history of blood clots.  She does smoke marijuana.  No 

history of cardiac disease.  on exam has tenderness chest wall. lungs CTA. 

heart RRR without murmur. ekg shows sinus rhyth without ST elevation. mild 

diffuse abd tenderness. chest xray shows no acute findings. wbc normal. crp 

elevated. troponin zero. d-dimer elevated so will CTA. CTA shows no PE. 

discussed that is likely costrochronditis as has reproducible chest pain and to 

treat with ibuprofen. with stomach irritation with ibuprofen will prescribe 

omeprazole.  patient understand and agrees with plan.





- Diagnoses


Differential Diagnosis/HQI/PQRI: Positive: Chest Wall Pain, Pneumonia, 

Pulmonary Embolism


Provider Diagnoses: 


 Chest wall pain








Discharge





- Sign-Out/Discharge


Documenting (check all that apply): Patient Departure


Patient Received Moderate/Deep Sedation with Procedure: No





- Discharge Plan


Condition: Good


Disposition: HOME


Prescriptions: 


Omeprazole CAP (NF) [Prilosec CAP* 20 MG] 20 mg PO DAILY #14 cap.dr


Patient Education Materials:  Chest Wall Pain (ED)


Referrals: 


Piero Jaramillo MD [Primary Care Provider] - 


Additional Instructions: 


Take ibuprofen every 6 hours as needed for pain


take omeprazole daily while taking ibuprofen


Follow up with primary within 5 days


Return to ED if develop any new or worsening symptoms





- Billing Disposition and Condition


Condition: GOOD


Disposition: Home

## 2020-01-15 ENCOUNTER — HOSPITAL ENCOUNTER (EMERGENCY)
Dept: HOSPITAL 25 - ED | Age: 35
Discharge: HOME | End: 2020-01-15
Payer: COMMERCIAL

## 2020-01-15 VITALS — SYSTOLIC BLOOD PRESSURE: 125 MMHG | DIASTOLIC BLOOD PRESSURE: 70 MMHG

## 2020-01-15 DIAGNOSIS — Z79.899: ICD-10-CM

## 2020-01-15 DIAGNOSIS — Z87.891: ICD-10-CM

## 2020-01-15 DIAGNOSIS — R80.9: Primary | ICD-10-CM

## 2020-01-15 LAB
ALBUMIN SERPL BCG-MCNC: 4 G/DL (ref 3.2–5.2)
ALBUMIN/GLOB SERPL: 1 {RATIO} (ref 1–3)
ALP SERPL-CCNC: 45 U/L (ref 34–104)
ALT SERPL W P-5'-P-CCNC: 7 U/L (ref 7–52)
ANION GAP SERPL CALC-SCNC: 8 MMOL/L (ref 2–11)
AST SERPL-CCNC: 9 U/L (ref 13–39)
BASOPHILS # BLD AUTO: 0 10^3/UL (ref 0–0.2)
BUN SERPL-MCNC: 6 MG/DL (ref 6–24)
BUN/CREAT SERPL: 8.6 (ref 8–20)
CALCIUM SERPL-MCNC: 9.3 MG/DL (ref 8.6–10.3)
CHLORIDE SERPL-SCNC: 106 MMOL/L (ref 101–111)
CREAT UR-MCNC: 398.41 MG/DL
EOSINOPHIL # BLD AUTO: 0 10^3/UL (ref 0–0.6)
GLOBULIN SER CALC-MCNC: 3.9 G/DL (ref 2–4)
GLUCOSE SERPL-MCNC: 90 MG/DL (ref 70–100)
HCO3 SERPL-SCNC: 22 MMOL/L (ref 22–32)
HCT VFR BLD AUTO: 40 % (ref 35–47)
HGB BLD-MCNC: 13.7 G/DL (ref 12–16)
LYMPHOCYTES # BLD AUTO: 0.7 10^3/UL (ref 1–4.8)
MCH RBC QN AUTO: 31 PG (ref 27–31)
MCHC RBC AUTO-ENTMCNC: 35 G/DL (ref 31–36)
MCV RBC AUTO: 90 FL (ref 80–97)
MONOCYTES # BLD AUTO: 0.6 10^3/UL (ref 0–0.8)
NEUTROPHILS # BLD AUTO: 5.5 10^3/UL (ref 1.5–7.7)
NRBC # BLD AUTO: 0 10^3/UL
NRBC BLD QL AUTO: 0
PLATELET # BLD AUTO: 229 10^3/UL (ref 150–450)
POTASSIUM SERPL-SCNC: 3.9 MMOL/L (ref 3.5–5)
PROT SERPL-MCNC: 7.9 G/DL (ref 6.4–8.9)
PROT UR-MCNC: 50 MG/DL
RBC # BLD AUTO: 4.4 10^6 /UL (ref 3.7–4.87)
RBC UR QL AUTO: (no result)
SODIUM SERPL-SCNC: 136 MMOL/L (ref 135–145)
TROPONIN I SERPL-MCNC: 0 NG/ML (ref ?–0.03)
WBC # BLD AUTO: 6.8 10^3/UL (ref 3.5–10.8)
WBC UR QL AUTO: (no result)

## 2020-01-15 PROCEDURE — 86140 C-REACTIVE PROTEIN: CPT

## 2020-01-15 PROCEDURE — 81015 MICROSCOPIC EXAM OF URINE: CPT

## 2020-01-15 PROCEDURE — 84484 ASSAY OF TROPONIN QUANT: CPT

## 2020-01-15 PROCEDURE — 71046 X-RAY EXAM CHEST 2 VIEWS: CPT

## 2020-01-15 PROCEDURE — 85025 COMPLETE CBC W/AUTO DIFF WBC: CPT

## 2020-01-15 PROCEDURE — 84156 ASSAY OF PROTEIN URINE: CPT

## 2020-01-15 PROCEDURE — 83605 ASSAY OF LACTIC ACID: CPT

## 2020-01-15 PROCEDURE — 80053 COMPREHEN METABOLIC PANEL: CPT

## 2020-01-15 PROCEDURE — 82570 ASSAY OF URINE CREATININE: CPT

## 2020-01-15 PROCEDURE — 87086 URINE CULTURE/COLONY COUNT: CPT

## 2020-01-15 PROCEDURE — 99284 EMERGENCY DEPT VISIT MOD MDM: CPT

## 2020-01-15 PROCEDURE — 36415 COLL VENOUS BLD VENIPUNCTURE: CPT

## 2020-01-15 PROCEDURE — 96361 HYDRATE IV INFUSION ADD-ON: CPT

## 2020-01-15 PROCEDURE — 85652 RBC SED RATE AUTOMATED: CPT

## 2020-01-15 PROCEDURE — 81003 URINALYSIS AUTO W/O SCOPE: CPT

## 2020-01-15 PROCEDURE — 96375 TX/PRO/DX INJ NEW DRUG ADDON: CPT

## 2020-01-15 PROCEDURE — 96374 THER/PROPH/DIAG INJ IV PUSH: CPT

## 2020-01-15 PROCEDURE — 93005 ELECTROCARDIOGRAM TRACING: CPT

## 2020-01-15 NOTE — ED
HPI Cardiac





- HPI Summary


HPI Summary: 


This patient is a 34-year-old female with a recent diagnosis of lupus 

presenting to the ED with 2 day hx of left-sided chest/rib which is worse when 

taking deep breaths. Endorses sweats and chills, but denies fevers. She thought 

is was originating from the flank, but states it is much higher, and locates 

the pain to the L side body. She states she had had this before and was dx with 

pleurisy.  Has had 2 CTA with negative findings despite elevated dimers.  Chest 

xrays have been negative.  She denies any known kidney dysfunction and denies 

any gross hematuria.  Denies peripheral edema.  No cough, congestion, n/v/c/d.





Rheumatologist is Dr. Busch from [facility].


Current medications include Humira and high dose prednisone.    








- History of Current Complaint


Chief Complaint: EDShortnessOfBreath


Stated Complaint: SOB PER PT


Time Seen by Provider: 01/15/20 06:38


Hx Obtained From: Patient, Medical Records


Hx Last Menstrual Period: depo


Onset/Duration: Started Days Ago


Timing: Constant


Initial Severity: Moderate


Current Severity: Moderate


Pain Intensity: 7


Pain Scale Used: 0-10 Numeric


Chest Pain Location: Left Lateral


Chest Pain Radiates: No


Aggravating Factor(s): Nothing


Alleviating Factor(s): Nothing


Associated Signs and Symptoms: Positive: Diaphoresis.  Negative: Chest Pain, 

Vision Changes, Anxiety, Recent Stress, Productive Cough, Nasal Congestion, URI





- Allergy/Home Medications


Allergies/Adverse Reactions: 


 Allergies











Allergy/AdvReac Type Severity Reaction Status Date / Time


 


No Known Allergies Allergy   Verified 01/15/20 06:23











Home Medications: 


 Home Medications





Adalimumab [Humira] 40 mg SUBCUT ONCE 01/15/20 [History Confirmed 01/15/20]


Ergocalciferol CAP* [Drisdol CAP*] 50,000 unit PO WEEKLY 01/15/20 [History 

Confirmed 01/15/20]


Hydroxychloroquine TAB* [Plaquenil TAB*] 200 mg PO BID 01/15/20 [History 

Confirmed 01/15/20]


Naproxen Sodium [Aleve] 220 mg PO .Q6-7HRS PRN 01/15/20 [History Confirmed 01/15

/20]


predniSONE 10 mg TAB [Deltasone 10 MG TAB*] 20 mg PO DAILY 01/15/20 [History 

Confirmed 01/15/20]











PMH/Surg Hx/FS Hx/Imm Hx


Previously Healthy: Yes


Endocrine/Hematology History: 


   Denies: Hx Anticoagulant Therapy, Hx Diabetes


Cardiovascular History: 


   Denies: Hx Hypertension, Hx Pacemaker/ICD


Respiratory History: 


   Denies: Hx Asthma, Hx Chronic Obstructive Pulmonary Disease (COPD)


GI History: Reports: Hx Gastroesophageal Reflux Disease - only during pregnancy


 History: 


   Denies: Hx Renal Disease


Sensory History: Reports: Hx Contacts or Glasses - contacts, will wear glasses 

day of surgery


   Denies: Hx Hearing Aid


Opthamlomology History: Reports: Hx Contacts or Glasses - contacts, will wear 

glasses day of surgery


Neurological History: Reports: Hx Migraine - takes prn excedrin


Psychiatric History: 


   Denies: Hx Panic Disorder





- Surgical History


Surgery Procedure, Year, and Place: leep procedure , 15 yrs ago - [place].

  bilateral axillary hidrandenitis - 2 yrs ago - [place]


Hx Anesthesia Reactions: No





- Immunization History


Hx Pertussis Vaccination: No


Immunizations Up to Date: Yes


Infectious Disease History: No


Infectious Disease History: Reports: Hx of Known/Suspected MRSA - groin abcess


   Denies: History Other Infectious Disease, Traveled Outside the [place] in Last 30 

Days





- Family History


Known Family History: Positive: Diabetes


   Negative: Cardiac Disease, Hypertension, Blood Disorder





- Social History


Occupation: Employed Full-time


Lives: With Family


Alcohol Use: Occasionally


Hx Substance Use: Yes


Substance Use Type: Reports: Marijuana


Substance Use Comment - Amount & Last Used: occ.


Hx Tobacco Use: No


Smoking Status (MU): Former Smoker


Have You Smoked in the Last Year: No





Review of Systems


Positive: Chills, Skin Diaphoresis.  Negative: Fever, Fatigue


Negative: Photophobia, Blurred Vision, Diplopia, Drainage


Positive: Chest Pain - left sided worse with inspiration.  Negative: 

Palpitations


Negative: Shortness Of Breath, Cough


Genitourinary: Negative


Positive: no symptoms reported, see HPI.  Negative: burning, dysuria, discharge

, flank pain, hematuria, incontinence


Negative: Arthralgia, Myalgia


Skin: Negative


Neurological: Negative


All Other Systems Reviewed And Are Negative: Yes





Physical Exam


Triage Information Reviewed: Yes


Vital Signs On Initial Exam: 


 Initial Vitals











Temp Pulse Resp BP Pulse Ox


 


 98.7 F   88   20   126/77   98 


 


 01/15/20 06:20  01/15/20 06:20  01/15/20 06:20  01/15/20 06:20  01/15/20 06:20











Vital Signs Reviewed: Yes


Appearance: Positive: Well-Appearing, Well-Nourished


Skin: Positive: Warm, Skin Color Reflects Adequate Perfusion


Head/Face: Positive: Normal Head/Face Inspection


Eyes: Positive: EOMI, FLORA, Conjunctiva Clear


Neck: Positive: Supple, Nontender, No Lymphadenopathy


Respiratory/Lung Sounds: Positive: Clear to Auscultation, Breath Sounds Present


Cardiovascular: Positive: RRR, Pulses are Symmetrical in both Upper and Lower 

Extremities


Abdomen Description: Positive: No Organomegaly, Soft.  Negative: CVA Tenderness 

(R), CVA Tenderness (L)


Musculoskeletal: Positive: Normal, Strength/ROM Intact


Neurological: Positive: Sensory/Motor Intact, Alert, Oriented to Person Place, 

Time, Speech Normal


Psychiatric: Positive: Normal, Affect/Mood Appropriate


AVPU Assessment: Alert





Procedures





- Sedation


Patient Received Moderate/Deep Sedation with Procedure: No





Diagnostics





- Vital Signs


 Vital Signs











  Temp Pulse Resp BP Pulse Ox


 


 01/15/20 06:20  98.7 F  88  20  126/77  98














- Laboratory


Result Diagrams: 


 01/15/20 07:30





 01/15/20 07:30


Lab Statement: Any lab studies that have been ordered have been reviewed, and 

results considered in the medical decision making process.





Disposition





- Course


Course Of Treatment: During this course of treatment, the patient is evaluated 

for sweats and chills and L sided chest/side body pain.  Patient states she has 

had this for 2 weeks, although worse over the past 2 days and has remained 

intermittent.  Denies urinary sxs or hematuria.  Patient appears well, 

nondiaphoretic and nontoxic in appearing.  No CVA tenderness bilaterally.  

Lungs CTA, RRR.  No pleural rub.  Labs obtained: ESR is 35, CRP of 79.  2+ blood

, 2+ protein, 3+ RBC.  Discussed case with rheumatologist Dr. Hager 

recommending creatinine/protein ratio.  Urine protein, equals 50 mL, creatinine 

equals 398 for a ratio of .1g/day.  Pt will discontinue Humira, f/u with Dr. Hager on 1/20/20 as scheduled and f/u with her dermatologist regarding her 

discontinuation of her humira.  Re-examination following toradol, pt states 

symptoms have improved.





- Differential Dx - Cardiopulmonary


Differential Diagnoses - Cardiopulmonary: Other - lupus flare, medication 

reaction, chest pain, hematuria





- Diagnoses


Provider Diagnoses: 


 Proteinuria








Discharge ED





- Sign-Out/Discharge


Documenting (check all that apply): Patient Departure





- Discharge Plan


Condition: Stable


Disposition: HOME


Referrals: 


Cha Chambers MD [Primary Care Provider] - 


Additional Instructions: 


As discussed, per Dr. Hager, you should have a discussion with your 

dermatologist regarding discontinuing your Humira as this may be worsening your 

flares/symptoms


Please follow up with his office as scheduled on 1/20/20.  If you develop 

worsening symptoms, please return to the ED





- Billing Disposition and Condition


Condition: STABLE


Disposition: Home